# Patient Record
Sex: FEMALE | Race: WHITE | HISPANIC OR LATINO | Employment: FULL TIME | URBAN - METROPOLITAN AREA
[De-identification: names, ages, dates, MRNs, and addresses within clinical notes are randomized per-mention and may not be internally consistent; named-entity substitution may affect disease eponyms.]

---

## 2018-02-22 ENCOUNTER — HOSPITAL ENCOUNTER (EMERGENCY)
Facility: HOSPITAL | Age: 23
Discharge: HOME | End: 2018-02-22
Attending: EMERGENCY MEDICINE | Admitting: EMERGENCY MEDICINE
Payer: COMMERCIAL

## 2018-02-22 VITALS
BODY MASS INDEX: 23.62 KG/M2 | TEMPERATURE: 98.5 F | RESPIRATION RATE: 20 BRPM | OXYGEN SATURATION: 99 % | DIASTOLIC BLOOD PRESSURE: 68 MMHG | SYSTOLIC BLOOD PRESSURE: 125 MMHG | HEART RATE: 100 BPM | WEIGHT: 125 LBS

## 2018-02-22 DIAGNOSIS — R56.9 SEIZURE (HCC): Primary | ICD-10-CM

## 2018-02-22 DIAGNOSIS — Z86.69 HISTORY OF EPILEPSY: ICD-10-CM

## 2018-02-22 DIAGNOSIS — Z91.14 NONCOMPLIANCE WITH MEDICATION REGIMEN: ICD-10-CM

## 2018-02-22 LAB
ATRIAL RATE: 96 BPM
EXT PREG TEST URINE: NEGATIVE
P AXIS: 72 DEGREES
PR INTERVAL: 166 MS
QRS AXIS: 92 DEGREES
QRSD INTERVAL: 82 MS
QT INTERVAL: 320 MS
QTC INTERVAL: 404 MS
T WAVE AXIS: 65 DEGREES
VENTRICULAR RATE: 96 BPM

## 2018-02-22 PROCEDURE — 93005 ELECTROCARDIOGRAM TRACING: CPT

## 2018-02-22 PROCEDURE — 81025 URINE PREGNANCY TEST: CPT | Performed by: EMERGENCY MEDICINE

## 2018-02-22 PROCEDURE — 99284 EMERGENCY DEPT VISIT MOD MDM: CPT

## 2018-02-22 RX ORDER — LAMOTRIGINE 100 MG/1
100 TABLET ORAL 2 TIMES DAILY
COMMUNITY
End: 2018-07-06

## 2018-02-22 RX ORDER — LAMOTRIGINE 100 MG/1
100 TABLET ORAL 2 TIMES DAILY
Qty: 30 TABLET | Refills: 0 | Status: SHIPPED | OUTPATIENT
Start: 2018-02-22 | End: 2018-02-22

## 2018-02-22 RX ORDER — LAMOTRIGINE 100 MG/1
100 TABLET ORAL 2 TIMES DAILY
Qty: 30 TABLET | Refills: 0 | Status: SHIPPED | OUTPATIENT
Start: 2018-02-22 | End: 2018-07-06 | Stop reason: SDUPTHER

## 2018-02-22 NOTE — ED PROVIDER NOTES
History  Chief Complaint   Patient presents with    Seizure - Prior Hx Of     Patient states she had a grand mal seizure at approximately 24 today while at work, patient was found on the floor by coworkers, seizure lasted approx 2 minutes; pt  has a h/o of seizures and missed dose of Lamictal this morning; pt  unsure if she hit head however pt denies pain, no injuries noted; GCS 13    49-year-old female with past medical history of epilepsy who is presenting after experiencing a seizure at approximately 24 while at work  The seizure was a witnessed, generalized tonic-clonic seizure that lasted approximately 2 minutes  Patient remained at work for over an hour as she did not want to come to the hospital because she does not have health insurance  Patient is on Lamictal twice daily for her epilepsy  She reports that her seizures have been well controlled, with her last seizure occurring several years ago  Patient admits that she has been noncompliant with her Lamictal   She states that she has missed numerous doses over the past week  Otherwise, patient denies any recent changes in her medical condition  She has no other complaints  Review of systems is otherwise negative  Patient denies fever, chills, diaphoresis, unintentional weight loss, headache, vision changes, extremity numbness or weakness, chest pain or pressure, palpitations, shortness of breath, cough, nausea, vomiting, abdominal pain, diarrhea, constipation, melena, hematochezia, dysuria, urinary frequency, and hematuria  Of note, patient states that she took Plan B approximately 1 week ago  However she states that her menstrual period started today  Plan:  Generalized tonic-clonic seizure in a patient with known history of epilepsy  Patient admits to medication noncompliance  Her neurological examination is nonfocal  She has no other complaints  Fingerstick glucose was within normal limits, 111   Therefore, we will check a urine pregnancy and advise the patient to take her Lamictal as directed  Prior to Admission Medications   Prescriptions Last Dose Informant Patient Reported? Taking?   lamoTRIgine (LaMICtal) 100 mg tablet 2/21/2018 at Unknown time  Yes Yes   Sig: Take 100 mg by mouth 2 (two) times a day      Facility-Administered Medications: None       Past Medical History:   Diagnosis Date    Asthma     Seizures ([facility] [address] )        History reviewed  No pertinent surgical history  History reviewed  No pertinent family history  I have reviewed and agree with the history as documented  Social History   Substance Use Topics    Smoking status: Never Smoker    Smokeless tobacco: Never Used    Alcohol use Yes      Comment: socially         Review of Systems   Constitutional: Negative for diaphoresis, fever and unexpected weight change  HENT: Negative for congestion, rhinorrhea and sore throat  Eyes: Negative for pain, discharge and visual disturbance  Respiratory: Negative for cough, shortness of breath and wheezing  Cardiovascular: Negative for chest pain, palpitations and leg swelling  Gastrointestinal: Negative for abdominal pain, blood in stool, constipation, diarrhea, nausea and vomiting  Genitourinary: Negative for dysuria, flank pain and hematuria  Musculoskeletal: Negative for arthralgias and joint swelling  Skin: Negative for rash and wound  Allergic/Immunologic: Negative for environmental allergies and food allergies  Neurological: Positive for seizures  Negative for dizziness, weakness and numbness  Hematological: Negative for adenopathy  Psychiatric/Behavioral: Negative for confusion and hallucinations         Physical Exam  ED Triage Vitals [02/22/18 1553]   Temperature Pulse Respirations Blood Pressure SpO2   98 5 °F (36 9 °C) (!) 111 20 157/79 98 %      Temp Source Heart Rate Source Patient Position - Orthostatic VS BP Location FiO2 (%)   Oral Monitor Sitting Right arm --      Pain Score No Pain           Orthostatic Vital Signs  Vitals:    02/22/18 1553 02/22/18 1600 02/22/18 1630   BP: 157/79 129/73 125/68   Pulse: (!) 111 (!) 106 100   Patient Position - Orthostatic VS: Sitting Sitting Lying       Physical Exam   Constitutional: She is oriented to person, place, and time  She appears well-developed and well-nourished  No distress  HENT:   Head: Normocephalic and atraumatic  Right Ear: External ear normal    Left Ear: External ear normal    Eyes: Conjunctivae and EOM are normal  Pupils are equal, round, and reactive to light  Cardiovascular: Normal rate, regular rhythm and normal heart sounds  No murmur heard  Pulmonary/Chest: Effort normal and breath sounds normal  No respiratory distress  She has no wheezes  She has no rales  Abdominal: Soft  Bowel sounds are normal  She exhibits no distension  There is no tenderness  There is no guarding  Musculoskeletal: Normal range of motion  She exhibits no deformity  Neurological: She is alert and oriented to person, place, and time  Patient is alert and oriented to time, person, place, and situation  Speech is fluent with no aphasia or dysarthria  CN II-XII are intact  Strength is 5/5 in the upper and lower extremities bilaterally  Sensation grossly intact  No dysmetria on finger to nose testing  No pronator drift  Skin: Skin is warm and dry  Capillary refill takes less than 2 seconds  Psychiatric: She has a normal mood and affect  Her behavior is normal  Thought content normal    Nursing note and vitals reviewed        ED Medications  Medications - No data to display    Diagnostic Studies  Results Reviewed     Procedure Component Value Units Date/Time    POCT pregnancy, urine [[id number]]  (Normal) Resulted:  02/22/18 1640    Lab Status:  Final result Updated:  02/22/18 1640     EXT PREG TEST UR (Ref: Negative) NEGATIVE                 No orders to display         Procedures  ECG 12 Lead Documentation  Date/Time: 2/22/2018 5:10 PM  Performed by: Hay Iniguez by: Jere Diehl     Patient location:  ED  Previous ECG:     Previous ECG:  Compared to current    Comparison ECG info:  February 24, 2014    Similarity:  No change    Comparison to cardiac monitor: Yes    Interpretation:     Interpretation: normal    Rate:     ECG rate:  96    ECG rate assessment: normal    Rhythm:     Rhythm: sinus rhythm    Ectopy:     Ectopy: none    QRS:     QRS axis:  Right    QRS intervals:  Normal  Conduction:     Conduction: normal    ST segments:     ST segments:  Normal  T waves:     T waves: normal    Comments:      EKG demonstrates normal sinus rhythm at 96 beats per minute  No acute ischemic changes such as Q waves, T wave inversions, ST segment depressions, or ST segment elevations  No right heart strain  Right axis deviation was present on prior EKG  No significant change from prior EKG  Overall, normal EKG  Phone Consults  ED Phone Contact    ED Course  ED Course as of Feb 23 0015   Thu Feb 22, 2018   1640 EXT PREG TEST UR (Ref: Negative): NEGATIVE                               MDM  Number of Diagnoses or Management Options  History of epilepsy: established and worsening  Noncompliance with medication regimen: new and does not require workup  Seizure [facility]): established and worsening  Diagnosis management comments:     41-year-old female with past medical history of epilepsy who is presenting after experiencing a witnessed generalized tonoclonic seizure at work  1   Seizure:  Patient has a known history of epilepsy  This has been relatively well controlled on Lamictal, as patient last had a seizure several years ago  Patient admits to noncompliance with her Lamictal   Patient had no other complaints  Vital signs within normal limits  Fingerstick glucose was 111  Neurological examination was nonfocal  Urine pregnancy test was negative   Due to patient's admitted noncompliance and her known history of epilepsy, no further workup is required  Patient was advised to take her Lamictal as prescribed and to follow up with her neurologist   Patient understands and agrees with the plan  We will discharge her at this time  Portions of the chart may have been created with voice recognition software  Occasional wrong word or "sound a like" substitutions may have occurred due to the inherent limitations of voice recognition software  Please read the chart carefully and recognize, using context, where substitutions have occurred  Amount and/or Complexity of Data Reviewed  Clinical lab tests: ordered and reviewed  Decide to obtain previous medical records or to obtain history from someone other than the patient: yes  Review and summarize past medical records: yes    Risk of Complications, Morbidity, and/or Mortality  Presenting problems: moderate  Diagnostic procedures: minimal  Management options: minimal    Patient Progress  Patient progress: improved    CritCare Time    Disposition  Final diagnoses:   [facility] )   History of epilepsy   Noncompliance with medication regimen     Time reflects when diagnosis was documented in both MDM as applicable and the Disposition within this note     Time User Action Codes Description Comment    2/22/2018  4:44 PM Harika Block Add [R56 9] Seizure ([facility] [address] )     2/22/2018  4:44 PM Harika Block Add [Z86 69] History of epilepsy     2/22/2018  4:44 PM Harika Block Add [Z91 14] Noncompliance with medication regimen       ED Disposition     ED Disposition Condition Comment    Discharge  Betsy Keys discharge to home/self care  Condition at discharge: Good        Follow-up Information     Follow up With Specialties Details Why Contact Info Additional Asmita Monsivais Neurology [facility] Neurology Call As needed   [address] [facility]  [address] Emergency Department Emergency Medicine Go to If symptoms worsen: recurrent seizures despite taking medication, severe headache, arm/leg numbness or weakness  [address] [facility] ED, [address] [facility] [address], [address], [place], [address]        Discharge Medication List as of 2/22/2018  4:47 PM      START taking these medications    Details   !! lamoTRIgine (LaMICtal) 100 mg tablet Take 1 tablet (100 mg total) by mouth 2 (two) times a day, Starting Thu 2/22/2018, Normal       !! - Potential duplicate medications found  Please discuss with provider  CONTINUE these medications which have NOT CHANGED    Details   !! lamoTRIgine (LaMICtal) 100 mg tablet Take 100 mg by mouth 2 (two) times a day, Historical Med       !! - Potential duplicate medications found  Please discuss with provider  No discharge procedures on file  ED Provider  Attending physically available and evaluated Lawrence Barahona I managed the patient along with the ED Attending      Electronically Signed by         María Haddad MD  02/23/18 3409

## 2018-02-22 NOTE — ED ATTENDING ATTESTATION
Joe Goodman MD, saw and evaluated the patient  I have discussed the patient with the resident/non-physician practitioner and agree with the resident's/non-physician practitioner's findings, Plan of Care, and MDM as documented in the resident's/non-physician practitioner's note, except where noted  All available labs and Radiology studies were reviewed  At this point I agree with the current assessment done in the Emergency Department  I have conducted an independent evaluation of this patient including a focused history and a physical exam     20-year-old female, history of seizure disorder, typically well controlled on Lamictal, presenting to the emergency department for evaluation of a witnessed grand mal seizure that lasted approximately 2 minutes  Patient believes that she might have fallen from a standing position but does not believe that she hit her head because she is having no head pain, neck pain, chest pain, shoulder pain, abdominal pain  Patient admits that she has been noncompliant with her Lamictal, often missing her afternoon dose  Patient currently denies headache  Ten systems reviewed negative except as noted in the history of present illness  The patient is resting comfortably on a stretcher in no acute respiratory distress  The patient appears nontoxic  HEENT reveals moist mucous membranes  Head is normocephalic and atraumatic  Conjunctiva and sclera are normal  Neck is nontender and supple with full range of motion to flexion, extension, lateral rotation  No meningismus appreciated  No masses are appreciated  Lungs are clear to auscultation bilaterally without any wheezes, rales or rhonchi  Heart is regular rate and rhythm without any murmurs, rubs or gallops  Abdomen is soft and nontender without any rebound or guarding  Extremities appear grossly normal without any significant arthropathy  Patient is awake, alert, and oriented x3  The patient has normal interaction  Cranial nerves 2-12 are intact  Motor is 5 out of 5 bilateral upper lower extremities  Sensation intact  Normal gait  69-year-old female presenting with breakthrough seizure secondary to medication noncompliance  Patient was counseled that she should not be driving a motor vehicle if she is noncompliant with her medications and having breakthrough seizures  Patient was counseled that she should be taking her medications as prescribed but could potentially follow up with her neurologist to see about getting changed will once daily medication  Patient understood instructions      Labs Reviewed   POCT PREGNANCY, URINE - Normal       Result Value Ref Range Status    EXT PREG TEST UR (Ref: Negative) NEGATIVE   Final

## 2018-02-22 NOTE — DISCHARGE INSTRUCTIONS
Please take Lamictal as directed  Please do not drive when you do not take your Lamictal as this places you and others at serious risk  Recurrent Seizures in Adults   WHAT YOU NEED TO KNOW:   A seizure is an episode of abnormal brain activity  A seizure can cause jerky muscle movements, loss of consciousness, or confusion  Recurrent means you have a seizure more than once  The cause of your seizures may not be known  Recurrent seizures may occur if you do not take antiseizure medicine as directed  Some common triggers are alcohol, drugs, lack of sleep, fever, or a virus  High or low blood sugar levels can also trigger a seizure  DISCHARGE INSTRUCTIONS:   Call 911 or have someone else call for any of the following:   · Your seizure lasts longer than 5 minutes  · You have a second seizure within 24 hours of your first     · You have trouble breathing after a seizure  · You cannot be woken after your seizure  · You have more than 1 seizure before you are fully awake or aware  · You have diabetes or are pregnant and have a seizure  · You have a seizure in water  Return to the emergency department if:   · You are injured during a seizure  Contact your healthcare provider if:   · You have a fever  · You are planning to get pregnant or are currently pregnant  · You have questions or concerns about your condition or care  Medicine:   · Antiepileptic  medicine may be given to control or prevent seizures  Do not  stop taking this medicine without the direction of a healthcare provider  · Take your medicine as directed  Contact your healthcare provider if you think your medicine is not helping or if you have side effects  Tell him of her if you are allergic to any medicine  Keep a list of the medicines, vitamins, and herbs you take  Include the amounts, and when and why you take them  Bring the list or the pill bottles to follow-up visits   Carry your medicine list with you in case of an emergency  Prevent another seizure:   · Take your antiseizure medicine every day at the same time  This will also help reduce side effects  Do not skip any doses  Do not stop taking this medicine unless directed by a healthcare provider  · Manage stress  Stress can trigger a seizure  Exercise can help you reduce stress  Talk to your healthcare provider about exercise that is safe for you  Other ways to manage stress include yoga, meditation, and biofeedback  Illness can be a form of stress  Eat a variety of healthy foods and drink plenty of liquids during an illness  · Set a regular sleep schedule  A lack of sleep can trigger a seizure  Try to go to sleep and wake up at the same times every day  Keep your bedroom quiet and dark  Talk to your healthcare provider if you are having trouble sleeping  · Manage other medical conditions  Manage other health conditions that may increase your risk for a seizure  Keep your blood sugar levels and blood pressure under control  · Limit or do not drink alcohol as directed  Alcohol can trigger a seizure, especially if you drink a large amount at one time  A drink of alcohol is 12 ounces of beer, 1½ ounces of liquor, or 5 ounces of wine  Talk to your healthcare provider about a safe amount of alcohol for you  Your provider may recommend that you do not drink any alcohol  Tell him or her if you need help to quit drinking  Manage recurrent seizures:   · Ask what safety precautions you should take  Talk with your healthcare provider about driving  You may not be able to drive until you are seizure-free for a period of time  You will need to check the law where you live  Also talk to your healthcare provider about swimming and bathing  You may drown or develop life-threatening heart or lung damage if you have a seizure in water  · Tell your friends, family members, and coworkers that you had a seizure    Give them the following instructions to use if you have another seizure:     ¨ Do not panic  ¨ Gently guide me to the floor or a soft surface  ¨ Do not hold me down or put anything in my mouth  ¨ Place me on my side to help prevent me from swallowing saliva or vomit  ¨ Protect me from injury  Remove sharp or hard objects from the area surrounding me, or cushion my head  ¨ Loosen the clothing around my head and neck  ¨ Time how long my seizure lasts  Call 911 if my seizure lasts longer than 5 minutes or if I have a second seizure  ¨ Stay with me until my seizure ends  Let me rest until I am fully awake  ¨ Perform CPR if I stop breathing or you cannot feel my pulse  ¨ Do not give me anything to eat or drink until I am fully awake  Follow up with your healthcare provider or neurologist as directed: You may need more tests to find the cause of your seizure  You may also need tests to check the level of antiseizure medicine in your blood  Your neurologist may need to change or adjust your medicine  Write down your questions so you remember to ask them during your visits  © 2017 [address] Information is for End User's use only and may not be sold, redistributed or otherwise used for commercial purposes  All illustrations and images included in CareNotes® are the copyrighted property of A [employer] M [employer] Inc  or [employer] Can[employer]  The above information is an  only  It is not intended as medical advice for individual conditions or treatments  Talk to your doctor, nurse or pharmacist before following any medical regimen to see if it is safe and effective for you

## 2018-07-06 DIAGNOSIS — R56.9 SEIZURE (HCC): ICD-10-CM

## 2018-07-06 DIAGNOSIS — Z86.69 HISTORY OF EPILEPSY: ICD-10-CM

## 2018-07-06 DIAGNOSIS — G40.309 GENERALIZED CONVULSIVE EPILEPSY (HCC): Primary | ICD-10-CM

## 2018-07-06 RX ORDER — LAMOTRIGINE 100 MG/1
100 TABLET ORAL 2 TIMES DAILY
Qty: 60 TABLET | Refills: 2 | Status: SHIPPED | OUTPATIENT
Start: 2018-07-06 | End: 2018-10-22

## 2018-07-06 NOTE — TELEPHONE ENCOUNTER
Rx sent  Please have her get a lamotrigine level done  Please inquire about compliance in the setting of a seizure due to noncompliance earlier this year  Remind her of scheduled f/u appt

## 2018-07-09 NOTE — TELEPHONE ENCOUNTER
Pt made aware of the below, states that she has been taking her medications as prescribed  Denies having any seizures  She will get her labs done within the [address] and keep her f/u appt

## 2018-07-10 ENCOUNTER — APPOINTMENT (OUTPATIENT)
Dept: LAB | Facility: HOSPITAL | Age: 23
End: 2018-07-10
Attending: PSYCHIATRY & NEUROLOGY
Payer: COMMERCIAL

## 2018-07-10 DIAGNOSIS — G40.309 GENERALIZED CONVULSIVE EPILEPSY (HCC): ICD-10-CM

## 2018-07-10 PROCEDURE — 36415 COLL VENOUS BLD VENIPUNCTURE: CPT

## 2018-07-10 PROCEDURE — 80175 DRUG SCREEN QUAN LAMOTRIGINE: CPT

## 2018-07-11 LAB — LAMOTRIGINE SERPL-MCNC: 7.5 UG/ML (ref 2–20)

## 2018-07-19 ENCOUNTER — APPOINTMENT (OUTPATIENT)
Dept: LAB | Facility: HOSPITAL | Age: 23
End: 2018-07-19
Payer: COMMERCIAL

## 2018-07-19 ENCOUNTER — TRANSCRIBE ORDERS (OUTPATIENT)
Dept: LAB | Facility: HOSPITAL | Age: 23
End: 2018-07-19

## 2018-07-19 DIAGNOSIS — Z11.3 SCREENING EXAMINATION FOR VENEREAL DISEASE: Primary | ICD-10-CM

## 2018-07-19 DIAGNOSIS — Z11.3 SCREENING EXAMINATION FOR VENEREAL DISEASE: ICD-10-CM

## 2018-07-19 PROCEDURE — 86803 HEPATITIS C AB TEST: CPT

## 2018-07-19 PROCEDURE — 36415 COLL VENOUS BLD VENIPUNCTURE: CPT

## 2018-07-19 PROCEDURE — 86592 SYPHILIS TEST NON-TREP QUAL: CPT

## 2018-07-19 PROCEDURE — 87389 HIV-1 AG W/HIV-1&-2 AB AG IA: CPT

## 2018-07-19 PROCEDURE — 87340 HEPATITIS B SURFACE AG IA: CPT

## 2018-07-20 LAB
HBV SURFACE AG SER QL: NORMAL
HCV AB SER QL: NORMAL
HIV 1+2 AB+HIV1 P24 AG SERPL QL IA: NORMAL
RPR SER QL: NORMAL

## 2018-07-25 ENCOUNTER — OFFICE VISIT (OUTPATIENT)
Dept: FAMILY MEDICINE CLINIC | Facility: CLINIC | Age: 23
End: 2018-07-25
Payer: COMMERCIAL

## 2018-07-25 VITALS
TEMPERATURE: 99.4 F | DIASTOLIC BLOOD PRESSURE: 58 MMHG | BODY MASS INDEX: 23.49 KG/M2 | OXYGEN SATURATION: 96 % | HEART RATE: 80 BPM | SYSTOLIC BLOOD PRESSURE: 100 MMHG | HEIGHT: 61 IN | RESPIRATION RATE: 16 BRPM | WEIGHT: 124.4 LBS

## 2018-07-25 DIAGNOSIS — Z83.3 FAMILY HISTORY OF DIABETES MELLITUS TYPE II: ICD-10-CM

## 2018-07-25 DIAGNOSIS — L20.82 FLEXURAL ECZEMA: ICD-10-CM

## 2018-07-25 DIAGNOSIS — Z91.010 PEANUT ALLERGY: ICD-10-CM

## 2018-07-25 DIAGNOSIS — Z13.220 SCREENING CHOLESTEROL LEVEL: ICD-10-CM

## 2018-07-25 DIAGNOSIS — Z13.1 SCREENING FOR DIABETES MELLITUS: ICD-10-CM

## 2018-07-25 DIAGNOSIS — Z00.00 HEALTHCARE MAINTENANCE: Primary | ICD-10-CM

## 2018-07-25 PROCEDURE — 3725F SCREEN DEPRESSION PERFORMED: CPT | Performed by: FAMILY MEDICINE

## 2018-07-25 PROCEDURE — 99385 PREV VISIT NEW AGE 18-39: CPT | Performed by: FAMILY MEDICINE

## 2018-07-25 RX ORDER — UREA 10 %
2 LOTION (ML) TOPICAL DAILY
COMMUNITY

## 2018-07-25 NOTE — PROGRESS NOTES
Assessment/Plan:    No problem-specific Assessment & Plan notes found for this encounter  Diagnoses and all orders for this visit:    Healthcare maintenance  -     Comprehensive metabolic panel; Future  -     Lipid Panel with Direct LDL reflex; Future  -     CBC and differential; Future  -     HEMOGLOBIN A1C W/ EAG ESTIMATION; Future    Flexural eczema  -     triamcinolone (KENALOG) 0 1 % ointment; Apply topically 2 (two) times a day  -     Comprehensive metabolic panel; Future  -     Lipid Panel with Direct LDL reflex; Future  -     CBC and differential; Future  -     HEMOGLOBIN A1C W/ EAG ESTIMATION; Future    Family history of diabetes mellitus type II  -     Comprehensive metabolic panel; Future  -     Lipid Panel with Direct LDL reflex; Future  -     CBC and differential; Future  -     HEMOGLOBIN A1C W/ EAG ESTIMATION; Future    Screening cholesterol level  -     Comprehensive metabolic panel; Future  -     Lipid Panel with Direct LDL reflex; Future  -     CBC and differential; Future  -     HEMOGLOBIN A1C W/ EAG ESTIMATION; Future    Screening for diabetes mellitus  -     Comprehensive metabolic panel; Future  -     Lipid Panel with Direct LDL reflex; Future  -     CBC and differential; Future  -     HEMOGLOBIN A1C W/ EAG ESTIMATION; Future    Peanut allergy  -     Ambulatory referral to Allergy; Future    Other orders  -     folic acid (FOLVITE) 358 MCG tablet; Take 1 tablet by mouth daily        Get labs  HM in 1 yr    Subjective:      Patient ID: Sukih Patricia is a 23 y o  female  HPI  Patient presents for a physical  Patient is a 25-year-old  female with history of epilepsy, intermittent asthma, peanut allergies the presents for a yearly physical   She has no acute issues or complaints today  For her seizure disorder she follows with [facility] neurologist is currently on Lamictal   She also follows with [facility] gyn and she is up-to-date with her Pap    She is a healthy balanced diet  Normal BMI  Denies smoking or illegaldrug use  Admits to drinking alcohol socially  She least for her dad and younger brother and her daughter who is 5years old  She works full-time as a   She is currently sexually active in a monogamous relationship, she uses the "pull out" method for contraception and occasional use of condoms  She is restricted on the type of OCP due to her current epilepsy treatment  The following portions of the patient's history were reviewed and updated as appropriate: allergies, current medications, past family history, past medical history, past social history, past surgical history and problem list     Review of Systems   Constitutional: Negative for activity change and appetite change  HENT: Negative  Eyes: Negative  Respiratory: Negative  Cardiovascular: Negative  Gastrointestinal: Negative  Endocrine: Negative  Genitourinary: Negative  Musculoskeletal: Negative for arthralgias  Skin: Positive for rash  Neurological: Negative  Hematological: Negative  Psychiatric/Behavioral: Negative  Objective:      /58   Pulse 80   Temp 99 4 °F (37 4 °C)   Resp 16   Ht 5' 0 83" (1 545 m)   Wt 56 4 kg (124 lb 6 4 oz)   LMP 07/01/2018   SpO2 96%   BMI 23 64 kg/m²          Physical Exam   Constitutional: She is oriented to person, place, and time  She appears well-developed and well-nourished  No distress  HENT:   Head: Normocephalic  Right Ear: External ear normal    Left Ear: External ear normal    Mouth/Throat: Oropharynx is clear and moist    Eyes: Conjunctivae and EOM are normal    Neck: No thyromegaly present  Cardiovascular: Normal rate, regular rhythm and normal heart sounds  Pulmonary/Chest: Effort normal and breath sounds normal  No respiratory distress  She has no wheezes  She has no rales  Abdominal: Soft  Bowel sounds are normal  She exhibits no distension  There is no tenderness  Musculoskeletal: Normal range of motion  Neurological: She is alert and oriented to person, place, and time  No cranial nerve deficit  Skin: Rash (dry patch- eczema on right side neck) noted  Psychiatric: She has a normal mood and affect   Her behavior is normal

## 2018-09-02 ENCOUNTER — HOSPITAL ENCOUNTER (EMERGENCY)
Facility: HOSPITAL | Age: 23
Discharge: HOME | End: 2018-09-02
Attending: EMERGENCY MEDICINE | Admitting: EMERGENCY MEDICINE

## 2018-09-02 ENCOUNTER — APPOINTMENT (EMERGENCY)
Dept: RADIOLOGY | Facility: HOSPITAL | Age: 23
End: 2018-09-02

## 2018-09-02 VITALS
TEMPERATURE: 98.8 F | HEART RATE: 102 BPM | HEIGHT: 60 IN | OXYGEN SATURATION: 97 % | BODY MASS INDEX: 24.54 KG/M2 | RESPIRATION RATE: 18 BRPM | SYSTOLIC BLOOD PRESSURE: 112 MMHG | DIASTOLIC BLOOD PRESSURE: 66 MMHG | WEIGHT: 125 LBS

## 2018-09-02 DIAGNOSIS — S93.402A LEFT ANKLE SPRAIN: Primary | ICD-10-CM

## 2018-09-02 PROCEDURE — 73610 X-RAY EXAM OF ANKLE: CPT

## 2018-09-02 PROCEDURE — 99283 EMERGENCY DEPT VISIT LOW MDM: CPT

## 2018-09-02 NOTE — ED ATTENDING ATTESTATION
Amy Christine MD, saw and evaluated the patient  All available labs and X-rays were ordered by me or the resident and have been reviewed by myself  I discussed the patient with the resident / non-physician and agree with the resident's / non-physician practitioner's findings and plan as documented in the resident's / non-physician practicitioner's note, except where noted  At this point, I agree with the current assessment done in the ED  Chief Complaint   Patient presents with    Ankle Injury     Patient reports she tripped this morning and injured her left ankle  23 F:  - was walking, rolled her ankle suffering inversion injury --> left ankle pain this morning  - ambulatory after the incident  - ambulatory now but has significant pain/swelling so she came in for evaluation  - has too much pain to go to work  - no medications used  PMH:  - None  PSH:  - None  Denies smoking, drinking, drugs  PE:  Vitals:    09/02/18 1751   BP: 112/66   BP Location: Left arm   Pulse: 102   Resp: 18   Temp: 98 8 °F (37 1 °C)   TempSrc: Tympanic   SpO2: 97%   Weight: 56 7 kg (125 lb)   Height: 5' (1 524 m)   General: VS reviewed  Appears in NAD  awake, alert  Well-nourished, well-developed  Appears stated age  Speaking normally in full sentences  Head: Normocephalic, atraumatic, nontender  Eyes: EOM-I  No diplopia  No hyphema  No subconjunctival hemorrhages  Symmetrical lids  ENT: Atraumatic external nose and ears  MMM  No malocclusion  No stridor  Normal phonation  No drooling  Normal swallowing  Neck: No JVD  CV: No pallor noted  Peripheral pulses +2 throughout  No chest wall tenderness  Lungs:   No tachypnea  No respiratory distress  MSK:   EHL/FHL/PF/DF/KF/KE/HF/HE 5/5  Point tenderness lateral malleolus with no ecchymosis noted  No tenderness of the 5th metatarsal, no tenderness of fibular head, no cog-sensation with rotation along joint of LisFranc  Skin: Dry, intact     Neuro: Awake, alert, GCS15, CN II-XII grossly intact  Motor grossly intact  Psychiatric/Behavioral: Appropriate mood and affect   Exam: deferred  A:  - Ankle pain  P:  - XR  - airsplint/crutches PRN  - ortho / pods f/u  - 13 point ROS was performed and all are normal unless stated in the history above  - Nursing note reviewed  Vitals reviewed  - Orders placed by myself and/or advanced practitioner / resident     - Previous chart was not reviewed  - No language barrier    - History obtained from patient  - There are no limitations to the history obtained  - Critical care time: Not applicable for this patient  Final Diagnosis:  1  Left ankle sprain      XR reviewed with the resident: appears no obvious fracture  Medications - No data to display  XR ankle 3+ views LEFT    (Results Pending)     Orders Placed This Encounter   Procedures    Crutches    XR ankle 3+ views LEFT     Labs Reviewed - No data to display  Time reflects when diagnosis was documented in both MDM as applicable and the Disposition within this note     Time User Action Codes Description Comment    9/2/2018  8:30 PM Gallo Weinstein Add [G96 589R] Right ankle sprain     9/2/2018  8:35 PM Gallo Weinstein Add [L97 851H] Left ankle sprain     9/2/2018  8:35 PM Gallo Weinstein Modify [P89 759S] Left ankle sprain     9/2/2018  8:35 PM Gallo Weinstein Remove [E65 131H] Right ankle sprain       ED Disposition     ED Disposition Condition Comment    Discharge  [address] discharge to home/self care      Condition at discharge: Good        Follow-up Information     Follow up With Specialties Details Why Contact Info    Jose Elias Alaniz MD [address] [place] [address] Orthopedic Surgery   [address] [id number]  [phone]        Discharge Medication List as of 9/2/2018  8:35 PM      CONTINUE these medications which have NOT CHANGED    Details   folic acid (FOLVITE) 609 MCG tablet Take 1 tablet by mouth daily, Historical Med      lamoTRIgine (LaMICtal) 100 mg tablet Take 1 tablet (100 mg total) by mouth 2 (two) times a day, Starting Fri 7/6/2018, Normal      triamcinolone (KENALOG) 0 1 % ointment Apply topically 2 (two) times a day, Starting Wed 7/25/2018, Normal           No discharge procedures on file  Prior to Admission Medications   Prescriptions Last Dose Informant Patient Reported? Taking?   folic acid (FOLVITE) 450 MCG tablet  Self Yes No   Sig: Take 1 tablet by mouth daily   lamoTRIgine (LaMICtal) 100 mg tablet  Self No No   Sig: Take 1 tablet (100 mg total) by mouth 2 (two) times a day   triamcinolone (KENALOG) 0 1 % ointment   No No   Sig: Apply topically 2 (two) times a day      Facility-Administered Medications: None       Portions of the record may have been created with voice recognition software  Occasional wrong word or "sound a like" substitutions may have occurred due to the inherent limitations of voice recognition software  Read the chart carefully and recognize, using context, where substitutions have occurred      Electronically signed by:  Kulwinder Groves

## 2018-09-03 NOTE — DISCHARGE INSTRUCTIONS

## 2018-09-03 NOTE — ED PROVIDER NOTES
History  Chief Complaint   Patient presents with    Ankle Injury     Patient reports she tripped this morning and injured her left ankle  This 47-year-old female who states she was walking at her house, when she "rolled her ankle" on the left side, and fell  To the ground  The patient states that she did not strike her head, and did not lose consciousness  The patient states she felt immediate pain at the left ankle, which is why she came to the hospital   She was able to ambulate immediately afterward, but with a significant limp  Patient denies any other injuries  She has no other symptoms currently  She denies pain elsewhere  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 Taking?   folic acid (FOLVITE) 595 MCG tablet  Self Yes No   Sig: Take 1 tablet by mouth daily   lamoTRIgine (LaMICtal) 100 mg tablet  Self No No   Sig: Take 1 tablet (100 mg total) by mouth 2 (two) times a day   triamcinolone (KENALOG) 0 1 % ointment   No No   Sig: Apply topically 2 (two) times a day      Facility-Administered Medications: None       Past Medical History:   Diagnosis Date    Allergic to peanuts     Anxiety     Asthma     Depression     Seizures (HCC)        Past Surgical History:   Procedure Laterality Date    INSERTION OF INTRAUTERINE DEVICE (IUD)         Family History   Problem Relation Age of Onset    Migraines Mother     Asthma Son     Diabetes Maternal Grandfather     Hypertension Maternal Grandfather     Asthma Other     Asthma Sister      I have reviewed and agree with the history as documented      Social History   Substance Use Topics    Smoking status: Current Some Day Smoker    Smokeless tobacco: Never Used    Alcohol use Yes      Comment: socially         Review of Systems Constitutional: Negative for chills and fever  HENT: Negative for congestion and rhinorrhea  Eyes: Negative for photophobia and visual disturbance  Respiratory: Negative for cough and shortness of breath  Cardiovascular: Negative for chest pain and palpitations  Gastrointestinal: Negative for abdominal pain, diarrhea, nausea and vomiting  Genitourinary: Negative for dysuria and frequency  Musculoskeletal: Positive for gait problem (  Secondary to pain)  Negative for neck pain and neck stiffness  Skin: Negative for pallor and rash  Neurological: Negative for light-headedness and headaches  All other systems reviewed and are negative  Physical Exam  ED Triage Vitals [09/02/18 1751]   Temperature Pulse Respirations Blood Pressure SpO2   98 8 °F (37 1 °C) 102 18 112/66 97 %      Temp Source Heart Rate Source Patient Position - Orthostatic VS BP Location FiO2 (%)   Tympanic Monitor Sitting Left arm --      Pain Score       8           Orthostatic Vital Signs  Vitals:    09/02/18 1751   BP: 112/66   Pulse: 102   Patient Position - Orthostatic VS: Sitting       Physical Exam   Constitutional: She is oriented to person, place, and time  Awake alert, well-appearing, no acute distress  Nontoxic in appearance  Appears stated age   HENT:   Head: Normocephalic and atraumatic  Mouth/Throat: Oropharynx is clear and moist  No oropharyngeal exudate  Eyes: Pupils are equal, round, and reactive to light  No scleral icterus  Neck: Normal range of motion  No JVD present  Cardiovascular: Normal rate, regular rhythm and normal heart sounds  No murmur heard  Pulmonary/Chest: Effort normal  No respiratory distress  She has no wheezes  She has no rales  Abdominal: Soft  She exhibits no distension  There is no tenderness  Musculoskeletal: Normal range of motion  She exhibits no edema     Tenderness to palpation of the lateral malleolus, as well as posterior to the lateral malleolus on the left side, with moderate swelling  No tenderness palpation of the medial malleolus  No tenderness to palpation of the 5th metacarpal, with the navicular  Neurological: She is alert and oriented to person, place, and time  She exhibits normal muscle tone  Skin: Skin is warm and dry  No rash noted  No pallor  ED Medications  Medications - No data to display    Diagnostic Studies  Results Reviewed     Procedure Component Value Units Date/Time    POCT pregnancy, urine [[id number]]     Lab Status:  No result                  XR ankle 3+ views LEFT    (Results Pending)         Procedures  Procedures      Phone Consults  ED Phone Contact    ED Course                               MDM  Number of Diagnoses or Management Options  Left ankle sprain:   Diagnosis management comments: This is a 20-year-old female presents status post left ankle injury after "rolling her ankle"  On exam, she does have tenderness to the left malleolus, but no other signs of injury  -  Ankle x-ray was negative for fracture  - Possible talofibular sprain   -  Plan for discharge home  Strict return precautions provided  Advised patient follow up with the orthopedics office should her pain continue, and to follow up with her primary care physician  CritCare Time    Disposition  Final diagnoses:   Left ankle sprain     Time reflects when diagnosis was documented in both MDM as applicable and the Disposition within this note     Time User Action Codes Description Comment    9/2/2018  8:30 PM Vibha List Add [N96 309Y] Right ankle sprain     9/2/2018  8:35 PM Vibha List Add [F69 002N] Left ankle sprain     9/2/2018  8:35 PM Vibha List Modify [N92 327Y] Left ankle sprain     9/2/2018  8:35 PM Vibha List Remove [S92 075M] Right ankle sprain       ED Disposition     ED Disposition Condition Comment    Discharge  [address] discharge to home/self care      Condition at discharge: Good        Follow-up Information     Follow up With Specialties Details Why Contact Info    Alfonso Napoles MD Family Medicine   [address]  [address] [facility] [address]  [address] [facility] [address] [phone]      [address] [place] [address] Orthopedic Surgery   [address] [id number] [phone]          Patient's Medications   Discharge Prescriptions    No medications on file     No discharge procedures on file  ED Provider  Attending physically available and evaluated Teresa Smalls I managed the patient along with the ED Attending      Electronically Signed by         Codie Lin MD  09/02/18 2884

## 2018-09-27 ENCOUNTER — HOSPITAL ENCOUNTER (EMERGENCY)
Facility: HOSPITAL | Age: 23
Discharge: HOME | End: 2018-09-27
Attending: EMERGENCY MEDICINE | Admitting: EMERGENCY MEDICINE

## 2018-09-27 VITALS
SYSTOLIC BLOOD PRESSURE: 96 MMHG | DIASTOLIC BLOOD PRESSURE: 56 MMHG | HEART RATE: 103 BPM | OXYGEN SATURATION: 98 % | TEMPERATURE: 98 F | RESPIRATION RATE: 18 BRPM

## 2018-09-27 DIAGNOSIS — R56.9 SEIZURE (HCC): Primary | ICD-10-CM

## 2018-09-27 LAB
ANION GAP SERPL CALCULATED.3IONS-SCNC: 10 MMOL/L (ref 4–13)
BASOPHILS # BLD AUTO: 0.03 THOUSANDS/ΜL (ref 0–0.1)
BASOPHILS NFR BLD AUTO: 0 % (ref 0–1)
BUN SERPL-MCNC: 13 MG/DL (ref 5–25)
CALCIUM SERPL-MCNC: 8.3 MG/DL (ref 8.3–10.1)
CHLORIDE SERPL-SCNC: 110 MMOL/L (ref 100–108)
CO2 SERPL-SCNC: 24 MMOL/L (ref 21–32)
CREAT SERPL-MCNC: 0.58 MG/DL (ref 0.6–1.3)
EOSINOPHIL # BLD AUTO: 0.28 THOUSAND/ΜL (ref 0–0.61)
EOSINOPHIL NFR BLD AUTO: 3 % (ref 0–6)
ERYTHROCYTE [DISTWIDTH] IN BLOOD BY AUTOMATED COUNT: 11.9 % (ref 11.6–15.1)
EXT PREG TEST URINE: NEGATIVE
GFR SERPL CREATININE-BSD FRML MDRD: 129 ML/MIN/1.73SQ M
GLUCOSE SERPL-MCNC: 87 MG/DL (ref 65–140)
HCT VFR BLD AUTO: 37.9 % (ref 34.8–46.1)
HGB BLD-MCNC: 12.6 G/DL (ref 11.5–15.4)
IMM GRANULOCYTES # BLD AUTO: 0.03 THOUSAND/UL (ref 0–0.2)
IMM GRANULOCYTES NFR BLD AUTO: 0 % (ref 0–2)
LYMPHOCYTES # BLD AUTO: 2.47 THOUSANDS/ΜL (ref 0.6–4.47)
LYMPHOCYTES NFR BLD AUTO: 28 % (ref 14–44)
MCH RBC QN AUTO: 31 PG (ref 26.8–34.3)
MCHC RBC AUTO-ENTMCNC: 33.2 G/DL (ref 31.4–37.4)
MCV RBC AUTO: 93 FL (ref 82–98)
MONOCYTES # BLD AUTO: 0.56 THOUSAND/ΜL (ref 0.17–1.22)
MONOCYTES NFR BLD AUTO: 6 % (ref 4–12)
NEUTROPHILS # BLD AUTO: 5.49 THOUSANDS/ΜL (ref 1.85–7.62)
NEUTS SEG NFR BLD AUTO: 63 % (ref 43–75)
NRBC BLD AUTO-RTO: 0 /100 WBCS
PLATELET # BLD AUTO: 266 THOUSANDS/UL (ref 149–390)
PMV BLD AUTO: 10.2 FL (ref 8.9–12.7)
POTASSIUM SERPL-SCNC: 3.7 MMOL/L (ref 3.5–5.3)
RBC # BLD AUTO: 4.07 MILLION/UL (ref 3.81–5.12)
SODIUM SERPL-SCNC: 144 MMOL/L (ref 136–145)
WBC # BLD AUTO: 8.86 THOUSAND/UL (ref 4.31–10.16)

## 2018-09-27 PROCEDURE — 36415 COLL VENOUS BLD VENIPUNCTURE: CPT | Performed by: EMERGENCY MEDICINE

## 2018-09-27 PROCEDURE — 99284 EMERGENCY DEPT VISIT MOD MDM: CPT

## 2018-09-27 PROCEDURE — 80175 DRUG SCREEN QUAN LAMOTRIGINE: CPT | Performed by: EMERGENCY MEDICINE

## 2018-09-27 PROCEDURE — 80048 BASIC METABOLIC PNL TOTAL CA: CPT | Performed by: EMERGENCY MEDICINE

## 2018-09-27 PROCEDURE — 81025 URINE PREGNANCY TEST: CPT | Performed by: EMERGENCY MEDICINE

## 2018-09-27 PROCEDURE — 85025 COMPLETE CBC W/AUTO DIFF WBC: CPT | Performed by: EMERGENCY MEDICINE

## 2018-09-27 RX ORDER — LAMOTRIGINE 25 MG/1
25 TABLET ORAL 2 TIMES DAILY
Qty: 60 TABLET | Refills: 0 | Status: SHIPPED | OUTPATIENT
Start: 2018-09-27 | End: 2018-10-22

## 2018-09-27 RX ORDER — LAMOTRIGINE 100 MG/1
100 TABLET ORAL 2 TIMES DAILY
Qty: 60 TABLET | Refills: 0 | Status: SHIPPED | OUTPATIENT
Start: 2018-09-27 | End: 2018-10-22

## 2018-09-27 NOTE — DISCHARGE INSTRUCTIONS
Recurrent Seizures in Adults   WHAT YOU NEED TO KNOW:   A seizure is an episode of abnormal brain activity  A seizure can cause jerky muscle movements, loss of consciousness, or confusion  Recurrent means you have a seizure more than once  The cause of your seizures may not be known  Recurrent seizures may occur if you do not take antiseizure medicine as directed  Some common triggers are alcohol, drugs, lack of sleep, fever, or a virus  High or low blood sugar levels can also trigger a seizure  DISCHARGE INSTRUCTIONS:   Call 911 or have someone else call for any of the following:   · Your seizure lasts longer than 5 minutes  · You have a second seizure within 24 hours of your first     · You have trouble breathing after a seizure  · You cannot be woken after your seizure  · You have more than 1 seizure before you are fully awake or aware  · You have diabetes or are pregnant and have a seizure  · You have a seizure in water  Return to the emergency department if:   · You are injured during a seizure  Contact your healthcare provider if:   · You have a fever  · You are planning to get pregnant or are currently pregnant  · You have questions or concerns about your condition or care  Medicine:   · Antiepileptic  medicine may be given to control or prevent seizures  Do not  stop taking this medicine without the direction of a healthcare provider  · Take your medicine as directed  Contact your healthcare provider if you think your medicine is not helping or if you have side effects  Tell him of her if you are allergic to any medicine  Keep a list of the medicines, vitamins, and herbs you take  Include the amounts, and when and why you take them  Bring the list or the pill bottles to follow-up visits  Carry your medicine list with you in case of an emergency  Prevent another seizure:   · Take your antiseizure medicine every day at the same time  This will also help reduce side effects   Do not skip any doses  Do not stop taking this medicine unless directed by a healthcare provider  · Manage stress  Stress can trigger a seizure  Exercise can help you reduce stress  Talk to your healthcare provider about exercise that is safe for you  Other ways to manage stress include yoga, meditation, and biofeedback  Illness can be a form of stress  Eat a variety of healthy foods and drink plenty of liquids during an illness  · Set a regular sleep schedule  A lack of sleep can trigger a seizure  Try to go to sleep and wake up at the same times every day  Keep your bedroom quiet and dark  Talk to your healthcare provider if you are having trouble sleeping  · Manage other medical conditions  Manage other health conditions that may increase your risk for a seizure  Keep your blood sugar levels and blood pressure under control  · Limit or do not drink alcohol as directed  Alcohol can trigger a seizure, especially if you drink a large amount at one time  A drink of alcohol is 12 ounces of beer, 1½ ounces of liquor, or 5 ounces of wine  Talk to your healthcare provider about a safe amount of alcohol for you  Your provider may recommend that you do not drink any alcohol  Tell him or her if you need help to quit drinking  Manage recurrent seizures:   · Ask what safety precautions you should take  Talk with your healthcare provider about driving  You may not be able to drive until you are seizure-free for a period of time  You will need to check the law where you live  Also talk to your healthcare provider about swimming and bathing  You may drown or develop life-threatening heart or lung damage if you have a seizure in water  · Tell your friends, family members, and coworkers that you had a seizure  Give them the following instructions to use if you have another seizure:     ¨ Do not panic  ¨ Gently guide me to the floor or a soft surface             ¨ Do not hold me down or put anything in my mouth     ¨ Place me on my side to help prevent me from swallowing saliva or vomit  ¨ Protect me from injury  Remove sharp or hard objects from the area surrounding me, or cushion my head  ¨ Loosen the clothing around my head and neck  ¨ Time how long my seizure lasts  Call 911 if my seizure lasts longer than 5 minutes or if I have a second seizure  ¨ Stay with me until my seizure ends  Let me rest until I am fully awake  ¨ Perform CPR if I stop breathing or you cannot feel my pulse  ¨ Do not give me anything to eat or drink until I am fully awake  Follow up with your healthcare provider or neurologist as directed: You may need more tests to find the cause of your seizure  You may also need tests to check the level of antiseizure medicine in your blood  Your neurologist may need to change or adjust your medicine  Write down your questions so you remember to ask them during your visits  © 2017 [address] [employer] Information is for End User's use only and may not be sold, redistributed or otherwise used for commercial purposes  All illustrations and images included in CareNotes® are the copyrighted property of [employer]  or [employer] Can[employer]  The above information is an  only  It is not intended as medical advice for individual conditions or treatments  Talk to your doctor, nurse or pharmacist before following any medical regimen to see if it is safe and effective for you
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
## 2018-09-28 ENCOUNTER — DOCUMENTATION (OUTPATIENT)
Dept: NEUROLOGY | Facility: CLINIC | Age: 23
End: 2018-09-28

## 2018-09-28 ENCOUNTER — TELEPHONE (OUTPATIENT)
Dept: NEUROLOGY | Facility: CLINIC | Age: 23
End: 2018-09-28

## 2018-09-28 NOTE — TELEPHONE ENCOUNTER
I just replied to a task from an MA/MR and responded that 10/22 is ok  Her lamotrigine dose was increased in the ER  I also forwarded the task to Darlene Zamudio regarding submitting a [place] [employer] reporting form  I asked them to talk to administration if it was determined finances are a barrier to follow up  She does need to be seen  She should let us know if there are more seizures or if there are problems with her medication

## 2018-09-28 NOTE — TELEPHONE ENCOUNTER
Called patient to follow up from conversation early this am regarding appointment and fee for Dr Elise Frias visit  I explained that we could not waive fee but we would work with her regarding payment  She needs to pay a co-pay of $30-50 00 to show good jess for the visit and then we would assist her to make contact with [employer] to make payment plan  We could possibly discount rate of visit but cannot tell her prior to visit what it would be per Jhoan Louis  Patient understood and was appreciative and said that she could work with that
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
## 2018-09-30 LAB — LAMOTRIGINE SERPL-MCNC: NORMAL UG/ML (ref 2–20)

## 2018-10-22 ENCOUNTER — OFFICE VISIT (OUTPATIENT)
Dept: NEUROLOGY | Facility: CLINIC | Age: 23
End: 2018-10-22

## 2018-10-22 VITALS
DIASTOLIC BLOOD PRESSURE: 62 MMHG | SYSTOLIC BLOOD PRESSURE: 100 MMHG | BODY MASS INDEX: 24.41 KG/M2 | WEIGHT: 125 LBS | HEART RATE: 72 BPM

## 2018-10-22 DIAGNOSIS — Z91.14 NON COMPLIANCE W MEDICATION REGIMEN: ICD-10-CM

## 2018-10-22 DIAGNOSIS — G40.309 GENERALIZED CONVULSIVE EPILEPSY (HCC): Primary | ICD-10-CM

## 2018-10-22 PROCEDURE — 99212 OFFICE O/P EST SF 10 MIN: CPT | Performed by: PSYCHIATRY & NEUROLOGY

## 2018-10-22 RX ORDER — LAMOTRIGINE 100 MG/1
100 TABLET ORAL 2 TIMES DAILY
Qty: 60 TABLET | Refills: 11 | Status: SHIPPED | OUTPATIENT
Start: 2018-10-22 | End: 2019-02-25 | Stop reason: SDUPTHER
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
## 2018-10-22 NOTE — PATIENT INSTRUCTIONS
1  Consider progesterone only birth control (does not interfere with lamotrigine)  2  Take lamotrigine 100 mg twice daily  Use an alarm on your phone  Call us if you have trouble obtaining a supply  3  Return in about 4 months  Call me with an update if you are not able to follow up in 4 months  4  Call us if there are seizures  5  Use Good Rx card at [facility]  6  Talk to HR about getting insurance prior to open enrollment

## 2018-10-22 NOTE — PROGRESS NOTES
Patient ID: Michael Augustin is a 23 y o  female  Assessment/Plan:    No problem-specific Assessment & Plan notes found for this encounter  {Assess/PlanSmartLinks:63238}       Subjective:    HPI    {[facility] HPI texts:64074}    {Common ambulatory SmartLinks:13647}         Objective:    Weight 56 7 kg (125 lb)  Physical Exam    Neurological Exam      ROS:    Review of Systems   Constitutional: Negative  Negative for appetite change and fever  HENT: Negative  Negative for hearing loss, tinnitus, trouble swallowing and voice change  Eyes: Negative  Negative for photophobia and pain  Respiratory: Negative  Negative for shortness of breath  Cardiovascular: Negative  Negative for palpitations  Gastrointestinal: Negative  Negative for nausea and vomiting  Endocrine: Negative  Negative for cold intolerance and heat intolerance  Genitourinary: Negative  Negative for dysuria, frequency and urgency  Musculoskeletal: Positive for back pain  Negative for myalgias and neck pain  Skin: Negative  Negative for rash  Neurological: Positive for headaches  Negative for dizziness, tremors, seizures, syncope, facial asymmetry, speech difficulty, weakness, light-headedness and numbness  Hematological: Negative  Does not bruise/bleed easily  Psychiatric/Behavioral: Negative for confusion, hallucinations and sleep disturbance  The patient is nervous/anxious           Mood swings

## 2018-10-23 PROBLEM — Z91.148 NON COMPLIANCE W MEDICATION REGIMEN: Status: ACTIVE | Noted: 2018-10-23

## 2018-10-23 PROBLEM — Z91.14 NON COMPLIANCE W MEDICATION REGIMEN: Status: ACTIVE | Noted: 2018-10-23

## 2019-02-25 ENCOUNTER — OFFICE VISIT (OUTPATIENT)
Dept: NEUROLOGY | Facility: CLINIC | Age: 24
End: 2019-02-25

## 2019-02-25 VITALS
SYSTOLIC BLOOD PRESSURE: 118 MMHG | HEIGHT: 60 IN | HEART RATE: 78 BPM | BODY MASS INDEX: 25.86 KG/M2 | WEIGHT: 131.7 LBS | DIASTOLIC BLOOD PRESSURE: 72 MMHG

## 2019-02-25 DIAGNOSIS — G40.309 GENERALIZED CONVULSIVE EPILEPSY (HCC): ICD-10-CM

## 2019-02-25 PROCEDURE — 99214 OFFICE O/P EST MOD 30 MIN: CPT | Performed by: PSYCHIATRY & NEUROLOGY

## 2019-02-25 RX ORDER — LAMOTRIGINE 100 MG/1
100 TABLET ORAL 2 TIMES DAILY
Qty: 180 TABLET | Refills: 3 | Status: SHIPPED | OUTPATIENT
Start: 2019-02-25 | End: 2019-08-21 | Stop reason: SDUPTHER

## 2019-02-25 NOTE — PATIENT INSTRUCTIONS
1  Let us know if there are seizures including concerning jerks or staring spells  2  Let us know when you are 6 months seizure free and we can submit paperwork to [facility]  3  Return in about 6 months
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
## 2019-03-12 ENCOUNTER — OFFICE VISIT (OUTPATIENT)
Dept: URGENT CARE | Age: 24
End: 2019-03-12
Payer: COMMERCIAL

## 2019-03-12 VITALS
TEMPERATURE: 98.8 F | RESPIRATION RATE: 18 BRPM | HEART RATE: 103 BPM | WEIGHT: 130 LBS | SYSTOLIC BLOOD PRESSURE: 95 MMHG | OXYGEN SATURATION: 98 % | BODY MASS INDEX: 25.39 KG/M2 | DIASTOLIC BLOOD PRESSURE: 50 MMHG

## 2019-03-12 DIAGNOSIS — J11.1 INFLUENZA-LIKE ILLNESS: Primary | ICD-10-CM

## 2019-03-12 PROCEDURE — G0382 LEV 3 HOSP TYPE B ED VISIT: HCPCS | Performed by: FAMILY MEDICINE

## 2019-03-12 PROCEDURE — 87430 STREP A AG IA: CPT | Performed by: FAMILY MEDICINE

## 2019-03-12 PROCEDURE — 87147 CULTURE TYPE IMMUNOLOGIC: CPT | Performed by: FAMILY MEDICINE

## 2019-03-12 PROCEDURE — 87070 CULTURE OTHR SPECIMN AEROBIC: CPT | Performed by: FAMILY MEDICINE

## 2019-03-12 RX ORDER — OSELTAMIVIR PHOSPHATE 75 MG/1
75 CAPSULE ORAL EVERY 12 HOURS SCHEDULED
Qty: 10 CAPSULE | Refills: 0 | Status: SHIPPED | OUTPATIENT
Start: 2019-03-12 | End: 2019-03-17

## 2019-03-12 NOTE — LETTER
March 12, 2019     Patient: Mary Smith   YOB: 1995   Date of Visit: 3/12/2019       To Whom It May Concern: It is my medical opinion that Spenser Morales may return to work on 03/16/2019  If you have any questions or concerns, please don't hesitate to call           Sincerely,        Aleksandra Santos DO    CC: No Recipients

## 2019-03-12 NOTE — PROGRESS NOTES
[id number] [employer] [facility]        NAME: Lawrence Barahona is a 20 y o  female  : 1995    MRN: [id number]  DATE: 2019  TIME: 9:53 AM    Assessment and Plan   Influenza-like illness [R69]  1  Influenza-like illness  oseltamivir (TAMIFLU) 75 mg capsule         Patient Instructions     Patient Instructions   Rest, limit activity  Tamiflu twice a day for 10 doses (5 days)  Cold/cough medication as needed  Tylenol, or ibuprofen (Advil/Motrin) as needed  Gargle and swish mouth with warm saltwater or mouthwash  Recheck/follow-up with family physician as needed  Please go to the hospital emergency department if needed  Follow up with PCP in 3-5 days  Proceed to  ER if symptoms worsen  Chief Complaint     Chief Complaint   Patient presents with    Sore Throat     headache, chills  Symptoms since   Took Nyquil and Ibuprofen         History of Present Illness       Chills, headache, congestion, sore throat, weakness since  (03/10/2019); patient states a co-worker has the flu      Review of Systems   Review of Systems   Constitutional: Positive for chills  HENT: Positive for congestion and sore throat  Respiratory: Positive for cough  Cardiovascular: Negative  Musculoskeletal: Positive for myalgias  Skin: Positive for pallor  Neurological: Positive for headaches           Current Medications       Current Outpatient Medications:     folic acid (FOLVITE) 187 MCG tablet, Take 1 tablet by mouth daily, Disp: , Rfl:     lamoTRIgine (LaMICtal) 100 mg tablet, Take 1 tablet (100 mg total) by mouth 2 (two) times a day, Disp: 180 tablet, Rfl: 3    triamcinolone (KENALOG) 0 1 % ointment, Apply topically 2 (two) times a day, Disp: 30 g, Rfl: 0    oseltamivir (TAMIFLU) 75 mg capsule, Take 1 capsule (75 mg total) by mouth every 12 (twelve) hours for 10 doses, Disp: 10 capsule, Rfl: 0    Current Allergies     Allergies as of 2019    (No Known Allergies)            The following portions of the patient's history were reviewed and updated as appropriate: allergies, current medications, past family history, past medical history, past social history, past surgical history and problem list      Past Medical History:   Diagnosis Date    Allergic to peanuts     Anxiety     Asthma     Depression     Seizures ([address] )        Past Surgical History:   Procedure Laterality Date    INSERTION OF INTRAUTERINE DEVICE (IUD)         Family History   Problem Relation Age of Onset    Migraines Mother     Asthma Son     Diabetes Maternal Grandfather     Hypertension Maternal Grandfather     Asthma Other     Asthma Sister          Medications have been verified  Objective   BP 95/50   Pulse 103   Temp 98 8 °F (37 1 °C) (Temporal)   Resp 18   Wt 59 kg (130 lb)   SpO2 98%   BMI 25 39 kg/m²        Physical Exam     Physical Exam   Constitutional: She is oriented to person, place, and time  She appears well-developed and well-nourished  She appears ill  HENT:   Right Ear: Tympanic membrane and ear canal normal    Left Ear: Tympanic membrane and ear canal normal    Nasal congestion with purulence mucus; injection, slight erythema the oropharynx   Neck: Normal range of motion  Neck supple  Cardiovascular: Normal rate, regular rhythm and normal heart sounds  Pulmonary/Chest: Effort normal and breath sounds normal    Neurological: She is alert and oriented to person, place, and time  No nuchal rigidity   Skin: There is pallor  Fair turgor   Psychiatric: She has a normal mood and affect  Her behavior is normal    Nursing note and vitals reviewed

## 2019-03-12 NOTE — PATIENT INSTRUCTIONS
Rest, limit activity  Tamiflu twice a day for 10 doses (5 days)  Cold/cough medication as needed  Tylenol, or ibuprofen (Advil/Motrin) as needed  Gargle and swish mouth with warm saltwater or mouthwash  Recheck/follow-up with family physician as needed  Please go to the hospital emergency department if needed

## 2019-03-13 LAB — BACTERIA THROAT CULT: ABNORMAL

## 2019-03-15 ENCOUNTER — TELEPHONE (OUTPATIENT)
Dept: URGENT CARE | Age: 24
End: 2019-03-15

## 2019-03-15 NOTE — TELEPHONE ENCOUNTER
03/15/2019 - 8:58 a m :  Spoke with Betsy via phone; throat culture results reviewed (positive Strep Group C); patient states she is feeling better; options discussed with patient regarding starting an antibiotic (patient declined at this time as she is feeling better)

## 2019-03-28 ENCOUNTER — TELEPHONE (OUTPATIENT)
Dept: NEUROLOGY | Facility: CLINIC | Age: 24
End: 2019-03-28

## 2019-03-28 NOTE — TELEPHONE ENCOUNTER
fyi:  Pt called and states that she is 6 month seizure free, last seizure was 9/27/18  No new episode  Advised pt to call [employer] to request for the form  Then drop it off to our office, fax or mail  Pt verbalized understanding          [phone]

## 2019-04-24 NOTE — TELEPHONE ENCOUNTER
Patient called stating she spoke with Kenneth and asked them to send form via fax approx 15 mins ago  I informed patient that we use central fax and we should receive the form over the next 6-12 hours directly into her chart  I asked milton to call back tomorrow if she would like to confirm we received the form  I also informed patient of $5 fee for [employer] form completion and 2 week turn around time  Patient verbalized understanding

## 2019-05-02 ENCOUNTER — TELEPHONE (OUTPATIENT)
Dept: NEUROLOGY | Facility: CLINIC | Age: 24
End: 2019-05-02

## 2019-06-11 NOTE — PATIENT INSTRUCTIONS
Respiratory contacted to evaluate pt status for use of CPAP.    Wellness Visit for Adults   AMBULATORY CARE:   A wellness visit  is when you see your healthcare provider to get screened for health problems  You can also get advice on how to stay healthy  Write down your questions so you remember to ask them  Ask your healthcare provider how often you should have a wellness visit  What happens at a wellness visit:  Your healthcare provider will ask about your health, and your family history of health problems  This includes high blood pressure, heart disease, and cancer  He or she will ask if you have symptoms that concern you, if you smoke, and about your mood  You may also be asked about your intake of medicines, supplements, food, and alcohol  Any of the following may be done:  · Your weight  will be checked  Your height may also be checked so your body mass index (BMI) can be calculated  Your BMI shows if you are at a healthy weight  · Your blood pressure  and heart rate will be checked  Your temperature may also be checked  · Blood and urine tests  may be done  Blood tests may be done to check your cholesterol levels  Abnormal cholesterol levels increase your risk for heart disease and stroke  You may also need a blood or urine test to check for diabetes if you are at increased risk  Urine tests may be done to look for signs of an infection or kidney disease  · A physical exam  includes checking your heartbeat and lungs with a stethoscope  Your healthcare provider may also check your skin to look for sun damage  · Screening tests  may be recommended  A screening test is done to check for diseases that may not cause symptoms  The screening tests you may need depend on your age, gender, family history, and lifestyle habits  For example, colorectal screening may be recommended if you are 48years old or older  Screening tests you need if you are a woman:   · A Pap smear  is used to screen for cervical cancer   Pap smears are usually done every 3 to 5 years depending on your age  You may need them more often if you have had abnormal Pap smear test results in the past  Ask your healthcare provider how often you should have a Pap smear  · A mammogram  is an x-ray of your breasts to screen for breast cancer  Experts recommend mammograms every 2 years starting at age 48 years  You may need a mammogram at age 52 years or younger if you have an increased risk for breast cancer  Talk to your healthcare provider about when you should start having mammograms and how often you need them  Vaccines you may need:   · Get an influenza vaccine  every year  The influenza vaccine protects you from the flu  Several types of viruses cause the flu  The viruses change over time, so new vaccines are made each year  · Get a tetanus-diphtheria (Td) booster vaccine  every 10 years  This vaccine protects you against tetanus and diphtheria  Tetanus is a severe infection that may cause painful muscle spasms and lockjaw  Diphtheria is a severe bacterial infection that causes a thick covering in the back of your mouth and throat  · Get a human papillomavirus (HPV) vaccine  if you are female and aged 23 to 32 or male 23 to 24 and never received it  This vaccine protects you from HPV infection  HPV is the most common infection spread by sexual contact  HPV may also cause vaginal, penile, and anal cancers  · Get a pneumococcal vaccine  if you are aged 72 years or older  The pneumococcal vaccine is an injection given to protect you from pneumococcal disease  Pneumococcal disease is an infection caused by pneumococcal bacteria  The infection may cause pneumonia, meningitis, or an ear infection  · Get a shingles vaccine  if you are aged 61 or older, even if you have had shingles before  The shingles vaccine is an injection to protect you from the varicella-zoster virus  This is the same virus that causes chickenpox   Shingles is a painful rash that develops in people who had chickenpox or have been exposed to the virus  How to eat healthy:  My Plate is a model for planning healthy meals  It shows the types and amounts of foods that should go on your plate  Fruits and vegetables make up about half of your plate, and grains and protein make up the other half  A serving of dairy is included on the side of your plate  The amount of calories and serving sizes you need depends on your age, gender, weight, and height  Examples of healthy foods are listed below:  · Eat a variety of vegetables  such as dark green, red, and orange vegetables  You can also include canned vegetables low in sodium (salt) and frozen vegetables without added butter or sauces  · Eat a variety of fresh fruits , canned fruit in 100% juice, frozen fruit, and dried fruit  · Include whole grains  At least half of the grains you eat should be whole grains  Examples include whole-wheat bread, wheat pasta, brown rice, and whole-grain cereals such as oatmeal     · Eat a variety of protein foods such as seafood (fish and shellfish), lean meat, and poultry without skin (turkey and chicken)  Examples of lean meats include pork leg, shoulder, or tenderloin, and beef round, sirloin, tenderloin, and extra lean ground beef  Other protein foods include eggs and egg substitutes, beans, peas, soy products, nuts, and seeds  · Choose low-fat dairy products such as skim or 1% milk or low-fat yogurt, cheese, and cottage cheese  · Limit unhealthy fats  such as butter, hard margarine, and shortening  Exercise:  Exercise at least 30 minutes per day on most days of the week  Some examples of exercise include walking, biking, dancing, and swimming  You can also fit in more physical activity by taking the stairs instead of the elevator or parking farther away from stores  Include muscle strengthening activities 2 days each week  Regular exercise provides many health benefits   It helps you manage your weight, and decreases your risk for type 2 diabetes, heart disease, stroke, and high blood pressure  Exercise can also help improve your mood  Ask your healthcare provider about the best exercise plan for you  General health and safety guidelines:   · Do not smoke  Nicotine and other chemicals in cigarettes and cigars can cause lung damage  Ask your healthcare provider for information if you currently smoke and need help to quit  E-cigarettes or smokeless tobacco still contain nicotine  Talk to your healthcare provider before you use these products  · Limit alcohol  A drink of alcohol is 12 ounces of beer, 5 ounces of wine, or 1½ ounces of liquor  · Lose weight, if needed  Being overweight increases your risk of certain health conditions  These include heart disease, high blood pressure, type 2 diabetes, and certain types of cancer  · Protect your skin  Do not sunbathe or use tanning beds  Use sunscreen with a SPF 15 or higher  Apply sunscreen at least 15 minutes before you go outside  Reapply sunscreen every 2 hours  Wear protective clothing, hats, and sunglasses when you are outside  · Drive safely  Always wear your seatbelt  Make sure everyone in your car wears a seatbelt  A seatbelt can save your life if you are in an accident  Do not use your cell phone when you are driving  This could distract you and cause an accident  Pull over if you need to make a call or send a text message  · Practice safe sex  Use latex condoms if are sexually active and have more than one partner  Your healthcare provider may recommend screening tests for sexually transmitted infections (STIs)  · Wear helmets, lifejackets, and protective gear  Always wear a helmet when you ride a bike or motorcycle, go skiing, or play sports that could cause a head injury  Wear protective equipment when you play sports  Wear a lifejacket when you are on a boat or doing water sports    © 2017 [address] [employer] Information is for End User's use only and may not be sold, redistributed or otherwise used for commercial purposes  All illustrations and images included in CareNotes® are the copyrighted property of A D A M [employer] Inc  or Brian Canales  The above information is an  only  It is not intended as medical advice for individual conditions or treatments  Talk to your doctor, nurse or pharmacist before following any medical regimen to see if it is safe and effective for you

## 2019-08-21 ENCOUNTER — OFFICE VISIT (OUTPATIENT)
Dept: NEUROLOGY | Facility: CLINIC | Age: 24
End: 2019-08-21

## 2019-08-21 VITALS
DIASTOLIC BLOOD PRESSURE: 66 MMHG | HEART RATE: 84 BPM | WEIGHT: 121.4 LBS | SYSTOLIC BLOOD PRESSURE: 100 MMHG | BODY MASS INDEX: 23.84 KG/M2 | HEIGHT: 60 IN

## 2019-08-21 DIAGNOSIS — R45.89 DEPRESSED MOOD: ICD-10-CM

## 2019-08-21 DIAGNOSIS — G40.309 GENERALIZED CONVULSIVE EPILEPSY (HCC): Primary | ICD-10-CM

## 2019-08-21 PROCEDURE — 99213 OFFICE O/P EST LOW 20 MIN: CPT | Performed by: PSYCHIATRY & NEUROLOGY

## 2019-08-21 RX ORDER — LAMOTRIGINE 100 MG/1
100 TABLET ORAL 2 TIMES DAILY
Qty: 180 TABLET | Refills: 3 | Status: SHIPPED | OUTPATIENT
Start: 2019-08-21 | End: 2019-11-06 | Stop reason: SDUPTHER
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
## 2019-08-21 NOTE — PATIENT INSTRUCTIONS
1  Continue lamotrigine 100 mg twice daily  2  Let us know if there are events concerning for seizure  3  Return in about one year

## 2019-11-06 DIAGNOSIS — G40.309 GENERALIZED CONVULSIVE EPILEPSY (HCC): ICD-10-CM

## 2019-11-08 RX ORDER — LAMOTRIGINE 100 MG/1
TABLET ORAL
Qty: 60 TABLET | Refills: 7 | Status: SHIPPED | OUTPATIENT
Start: 2019-11-08 | End: 2020-09-02 | Stop reason: SDUPTHER

## 2020-03-25 ENCOUNTER — TELEPHONE (OUTPATIENT)
Dept: FAMILY MEDICINE CLINIC | Facility: CLINIC | Age: 25
End: 2020-03-25

## 2020-03-25 NOTE — TELEPHONE ENCOUNTER
Patient is overdue for an office visit, called and left a message for patient to call back- will offer virtual visit due to the COVID 19

## 2020-09-02 ENCOUNTER — OFFICE VISIT (OUTPATIENT)
Dept: NEUROLOGY | Facility: CLINIC | Age: 25
End: 2020-09-02

## 2020-09-02 VITALS
WEIGHT: 126.2 LBS | TEMPERATURE: 97.4 F | HEART RATE: 84 BPM | DIASTOLIC BLOOD PRESSURE: 70 MMHG | BODY MASS INDEX: 24.77 KG/M2 | SYSTOLIC BLOOD PRESSURE: 106 MMHG | HEIGHT: 60 IN

## 2020-09-02 DIAGNOSIS — G40.309 GENERALIZED CONVULSIVE EPILEPSY (HCC): ICD-10-CM

## 2020-09-02 PROCEDURE — 99214 OFFICE O/P EST MOD 30 MIN: CPT | Performed by: PSYCHIATRY & NEUROLOGY

## 2020-09-02 RX ORDER — LAMOTRIGINE 100 MG/1
100 TABLET ORAL 2 TIMES DAILY
Qty: 180 TABLET | Refills: 3 | Status: SHIPPED | OUTPATIENT
Start: 2020-09-02 | End: 2021-04-28 | Stop reason: SDUPTHER
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
## 2020-09-02 NOTE — PATIENT INSTRUCTIONS
1  Continue lamotrigine 100 mg twice daily  2  Let us know if you plan to get pregnant or discover you are pregnant  3  Let us know if there are seizures  4  Have blood work done  5  Return in one year

## 2020-09-04 ENCOUNTER — TELEPHONE (OUTPATIENT)
Dept: NEUROLOGY | Facility: CLINIC | Age: 25
End: 2020-09-04

## 2020-09-04 NOTE — TELEPHONE ENCOUNTER
Pt called and states that she is looking to get medical marijuana  Pt is requesting copy of most recent office notes  Advised pt that she will need to fill out BERENICE first before our office can release her record  Pt verbalized understanding  Pt will stop by [facility] office today before 5 pm to fill our berenice and get a copy of office notes dated 9/2/20

## 2020-09-04 NOTE — TELEPHONE ENCOUNTER
Patient came into office to sign BERENICE and get copy of 9/2 office note   BERENICE scanned in, note released to patient

## 2021-01-19 ENCOUNTER — AMB VIDEO VISIT (OUTPATIENT)
Dept: OTHER | Facility: HOSPITAL | Age: 26
End: 2021-01-19

## 2021-01-19 NOTE — CARE ANYWHERE EVISITS
Visit Summary for Jagdeep Hayes - Gender: Female - Date of Birth: [id number]  Date: [id number] - Duration: 13 minutes  Patient: Jagdeep Hayes  Provider: Jeannine Aburto    Patient Contact Information  Address  [address]; [address] [facility]  [phone]    Visit Topics  Strep throat [Added By: Self - 2021-01-19]    Triage Questions   What is your current physical address in the event of a medical emergency? Answer []  Are you allergic to any medications? Answer []  Are you now or could you be pregnant? Answer []  Do you have any immune system compromise or chronic lung   disease? Answer []  Do you have any vulnerable family members in the home (infant, pregnant, cancer, elderly)? Answer []     Conversation Transcripts  [0A][0A] [Notification] You are connected with Fazal Matos is located in [place]  [0A][Notification] Jagdeep Abigail has shared health history  Anisa Hernandez  [0A][Notification] Fazal Good  Keniaes Mar has   added a prescription  [0A]    Diagnosis  Acute pharyngitis, unspecified    Procedures  Value: [id number] Code: CPT-4 UNLISTED E&M SERVICE    Medications Prescribed    penicillin V potassium  Dose : 1 tablet  Route : oral  Frequency : every 12 hours  Patient Instructions : Take 1 hour before or 2 hours after food  Refills : 0  Instructions to the Pharmacist : Substitutions allowed      Provider Notes  [0A][0A] This week[0A]patient has developed bad sore throat; tonsils are inflamed w/white spots,[0A]fever/felt feverish, swollen glands on self- palpation, no cough  Possible exposure[0A]to strep  No shortness of breath  No[0A]sinus pain, no earache  No   wheeze, no dysphonia, no stridor  Visually is not[0A]toxic, not in distress, not in extremis  No other symptoms     She doesn't have insurance[0A]Centor score:[0A]Fever (>100 4):  1[0A]Tonsillar exudate:  1[0A]Tender ant cervical[0A]lymphadenopathy:    1[0A]Absence of cough:  1[0A]Age <15 (Age >44[0A](-1)):  0[0A] [0A]PMH: Epilepsy  Denies history of rheumatic fever and[0A]post-strep glomerulonephritis[0A]Meds:  Lamotrigine[0A][0A]Allergies: NKDA[0A]Pregnant[0A]and/or nursing:  denies[0A]Assessment:    pharyngitis, high likelihood of strep    Centor Score: 4/5  [0A]Plan:  Also recommend test for COVID[0A]I am sending a prescription Penicillin VK to your pharmacy; please allow 30 minutes for them to process it  If not improved within[0A]48 hours then   see primary care physician or Urgent Care for rapid strep and[0A]culture  If rash occurs on Penicillin it may be a "cillin" rash[0A]that can occur in patients who do not have strep but may have Mono  [0A] Please watch for rash, stop the medication and be   seen in person  [0A]Discussed precautions  [0A] Patient voiced understanding and agrees[0A]to plan [0A][0A]Follow up:[0A]1)      If you were prescribed medication and there[0A]are any questions or problems with the prescription, call [phone]   anytime for assistance  [0A]2)      Please re-connect for another online visit or see an in-person[0A]provider should your symptoms worsen or persist   [0A]3)      Taking a probiotic (either in pill[0A]form or by eating yogurt that contains probiotics)   while using antibiotics can[0A]help prevent some of the troublesome side effects that antibiotics can[0A]sometimes cause  [0A]4)     [0A]Please print a copy of this note and send it to your regular[0A]doctor, or take it to your next visit so it may be   included in your medical[0A]record5)     [0A]If you take birth control please use another method of[0A]contraception (e g  condoms) for 1 month   [0A]Antibiotics can interfere with the effectiveness of birth control   [0A][0A]    Electronically signed by: Goldie Mariscal(NPI [id number])

## 2021-01-21 ENCOUNTER — HOSPITAL ENCOUNTER (EMERGENCY)
Facility: HOSPITAL | Age: 26
Discharge: HOME | End: 2021-01-21
Attending: EMERGENCY MEDICINE | Admitting: EMERGENCY MEDICINE
Payer: COMMERCIAL

## 2021-01-21 VITALS
RESPIRATION RATE: 16 BRPM | TEMPERATURE: 98.1 F | OXYGEN SATURATION: 98 % | DIASTOLIC BLOOD PRESSURE: 97 MMHG | SYSTOLIC BLOOD PRESSURE: 127 MMHG | HEART RATE: 88 BPM

## 2021-01-21 DIAGNOSIS — B37.9 CANDIDIASIS: Primary | ICD-10-CM

## 2021-01-21 DIAGNOSIS — N93.9 VAGINAL BLEEDING: ICD-10-CM

## 2021-01-21 DIAGNOSIS — N89.8 VAGINAL DISCHARGE: ICD-10-CM

## 2021-01-21 LAB
BACTERIA UR QL AUTO: ABNORMAL /HPF
BILIRUB UR QL STRIP: ABNORMAL
CLARITY UR: CLEAR
COLOR UR: YELLOW
COLOR, POC: NORMAL
EXT PREG TEST URINE: NEGATIVE
EXT. CONTROL ED NAV: NORMAL
GLUCOSE UR STRIP-MCNC: NEGATIVE MG/DL
HGB UR QL STRIP.AUTO: ABNORMAL
HYALINE CASTS #/AREA URNS LPF: ABNORMAL /LPF
KETONES UR STRIP-MCNC: ABNORMAL MG/DL
LEUKOCYTE ESTERASE UR QL STRIP: ABNORMAL
NITRITE UR QL STRIP: NEGATIVE
NON-SQ EPI CELLS URNS QL MICRO: ABNORMAL /HPF
PH UR STRIP.AUTO: 6 [PH] (ref 4.5–8)
PROT UR STRIP-MCNC: NEGATIVE MG/DL
RBC #/AREA URNS AUTO: ABNORMAL /HPF
SP GR UR STRIP.AUTO: 1.02 (ref 1–1.03)
UROBILINOGEN UR QL STRIP.AUTO: 1 E.U./DL
WBC #/AREA URNS AUTO: ABNORMAL /HPF

## 2021-01-21 PROCEDURE — 87491 CHLMYD TRACH DNA AMP PROBE: CPT | Performed by: EMERGENCY MEDICINE

## 2021-01-21 PROCEDURE — 81025 URINE PREGNANCY TEST: CPT | Performed by: EMERGENCY MEDICINE

## 2021-01-21 PROCEDURE — 99284 EMERGENCY DEPT VISIT MOD MDM: CPT

## 2021-01-21 PROCEDURE — 81001 URINALYSIS AUTO W/SCOPE: CPT

## 2021-01-21 PROCEDURE — 99284 EMERGENCY DEPT VISIT MOD MDM: CPT | Performed by: EMERGENCY MEDICINE

## 2021-01-21 PROCEDURE — 87591 N.GONORRHOEAE DNA AMP PROB: CPT | Performed by: EMERGENCY MEDICINE

## 2021-01-21 RX ORDER — FLUCONAZOLE 150 MG/1
150 TABLET ORAL ONCE
Status: COMPLETED | OUTPATIENT
Start: 2021-01-21 | End: 2021-01-21

## 2021-01-21 RX ORDER — FLUCONAZOLE 150 MG/1
150 TABLET ORAL ONCE
Qty: 1 TABLET | Refills: 0 | Status: SHIPPED | OUTPATIENT
Start: 2021-01-24 | End: 2021-01-24

## 2021-01-21 RX ADMIN — FLUCONAZOLE 150 MG: 150 TABLET ORAL at 15:06

## 2021-01-21 NOTE — ED ATTENDING ATTESTATION
1/21/2021  IJulius MD, saw and evaluated the patient  I have discussed the patient with the resident/non-physician practitioner and agree with the resident's/non-physician practitioner's findings, Plan of Care, and MDM as documented in the resident's/non-physician practitioner's note, except where noted  All available labs and Radiology studies were reviewed  I was present for key portions of any procedure(s) performed by the resident/non-physician practitioner and I was immediately available to provide assistance  At this point I agree with the current assessment done in the Emergency Department  I have conducted an independent evaluation of this patient a history and physical is as follows: This is a 30 y o  old female who presents to the ED for evaluation of vaginal spotting , had it during last period as well  Clear discharge, r sided pelvic pain  Is sexually active  No STI hx, neg home preg  No urinary symptoms  VS and nursing notes reviewed  General: Appears in NAD, awake, alert, speaking normally in full sentences  Well-nourished, well-developed  Appears stated age  Head: Normocephalic, atraumatic  Eyes: EOMI  Vision grossly normal  No subconjunctival hemorrhages or occular discharge noted  Symmetrical lids  ENT: Atraumatic external nose and ears  No stridor  Normal phonation  No drooling  Normal swallowing  Neck: No JVD  FROM  No goiter  CV: No pallor  Normal rate  Lungs: No tachypnea  No respiratory distress  MSK: Moving all extremities equally, no peripheral edema  ABD: Soft, nontender  Pelvic: per resident note  Skin: Dry, intact  No cyanosis  Neuro: Awake, alert, GCS15  CN II-XII grossly intact  Grossly normal gait  Psychiatric/Behavioral: Appropriate mood and affect  A/P: This is a 30 y o  female who presents to the ED for evaluation of vaginal stotting, discharge  We will do a pelvic, check sti, UA, Upreg  Refer to [facility]      ED Course       Critical Care Time  Procedures

## 2021-01-22 LAB
C TRACH DNA SPEC QL NAA+PROBE: NEGATIVE
N GONORRHOEA DNA SPEC QL NAA+PROBE: NEGATIVE

## 2021-01-23 NOTE — ED PROVIDER NOTES
History  Chief Complaint   Patient presents with    Vaginal Bleeding     had menses last week then started bleeding again a few days later  states this happened last month but does not have medical iinsurance to see a GYN     Patient is a 68-year-old female that presents for evaluation of vaginal bleeding and discharge  Patient says that starting last month, she had some abnormal vaginal spotting after her menstrual period  Patient says that her last menstrual period was about a week late and admitted several days ago  Patient says that she has been having some intermittent vaginal spotting and clear vaginal discharge since that time  She says that she has been having profuse clear/whitish discharge that is not foul-smelling per the patient  She also endorses some right-sided pelvic pain associated with her symptoms  Patient is not on any birth control and currently sexually active with 1 male partner  Previous history of trichomoniasis remotely  Patient endorses nausea with no vomiting  No chest pain or dyspnea noted  Patient has not taken anything for her symptoms  Patient recently placed on penicillin secondary to strep throat infection  Prior to Admission Medications   Prescriptions Last Dose Informant Patient Reported?  Taking?   folic acid (FOLVITE) 112 MCG tablet 1/21/2021 at Unknown time Self Yes Yes   Sig: Take 2 tablets by mouth daily    lamoTRIgine (LaMICtal) 100 mg tablet 1/21/2021 at Unknown time  No Yes   Sig: Take 1 tablet (100 mg total) by mouth 2 (two) times a day      Facility-Administered Medications: None       Past Medical History:   Diagnosis Date    Allergic to peanuts     Anxiety     Asthma     Depression     Seizures (HCC)        Past Surgical History:   Procedure Laterality Date    INSERTION OF INTRAUTERINE DEVICE (IUD)         Family History   Problem Relation Age of Onset   [employer] Mother     Asthma Son     Diabetes Maternal Grandfather     Hypertension Maternal Grandfather     Asthma Other     Asthma Sister      I have reviewed and agree with the history as documented  E-Cigarette/Vaping     E-Cigarette/Vaping Substances     Social History     Tobacco Use    Smoking status: Former Smoker    Smokeless tobacco: Never Used   Substance Use Topics    Alcohol use: Yes     Comment: socially     Drug use: No        Review of Systems   Constitutional: Negative for chills, diaphoresis, fatigue and fever  HENT: Negative for facial swelling, sore throat and trouble swallowing  Respiratory: Negative for cough, chest tightness, shortness of breath and wheezing  Cardiovascular: Negative for chest pain  Gastrointestinal: Positive for abdominal pain  Negative for abdominal distention, diarrhea, nausea and vomiting  Genitourinary: Positive for vaginal bleeding and vaginal discharge  Negative for dysuria  Musculoskeletal: Negative for back pain, neck pain and neck stiffness  Skin: Negative for color change, pallor, rash and wound  Neurological: Negative for weakness, light-headedness and numbness  Psychiatric/Behavioral: Negative for agitation  All other systems reviewed and are negative  Physical Exam  ED Triage Vitals [01/21/21 1303]   Temperature Pulse Respirations Blood Pressure SpO2   98 1 °F (36 7 °C) 88 16 127/97 98 %      Temp src Heart Rate Source Patient Position - Orthostatic VS BP Location FiO2 (%)   -- -- -- -- --      Pain Score       --             Orthostatic Vital Signs  Vitals:    01/21/21 1303   BP: 127/97   Pulse: 88       Physical Exam  Vitals signs reviewed  Constitutional:       General: She is not in acute distress  Appearance: She is well-developed  HENT:      Head: Normocephalic  Eyes:      Pupils: Pupils are equal, round, and reactive to light  Neck:      Musculoskeletal: Normal range of motion and neck supple  Cardiovascular:      Rate and Rhythm: Normal rate and regular rhythm        Heart sounds: Normal heart sounds  Pulmonary:      Effort: Pulmonary effort is normal       Breath sounds: Normal breath sounds  Abdominal:      General: Bowel sounds are normal  There is no distension  Palpations: Abdomen is soft  Tenderness: There is abdominal tenderness  There is no guarding  Comments: Mild right-sided pelvic tenderness without rebound tenderness or guarding  Genitourinary:     Comments: Patient with whitish discharge from slightly erythematous cervix  No significant cervical motion tenderness on exam   Musculoskeletal: Normal range of motion  General: No tenderness or deformity  Skin:     General: Skin is warm and dry  Capillary Refill: Capillary refill takes less than 2 seconds  Neurological:      Mental Status: She is alert and oriented to person, place, and time  Cranial Nerves: No cranial nerve deficit  Sensory: No sensory deficit  Psychiatric:         Behavior: Behavior normal          Thought Content: Thought content normal          Judgment: Judgment normal          ED Medications  Medications   fluconazole (DIFLUCAN) tablet 150 mg (150 mg Oral Given 1/21/21 1506)       Diagnostic Studies  Results Reviewed     Procedure Component Value Units Date/Time    Chlamydia/GC amplified DNA by PCR [[id number]]  (Normal) Collected: 01/21/21 1507    Lab Status: Final result Specimen: Cervix Updated: 01/22/21 1256     N gonorrhoeae, DNA Probe Negative     Chlamydia trachomatis, DNA Probe Negative    Narrative:      Test performed using PCR amplification of target DNA  This test is intended as an aid in the diagnosis of Chlamydial and gonococcal disease  This test has not been evaluated in patients younger than 15years of age and is not recommended for evaluation of suspected sexual abuse  Additional testing is recommended when the results do not correlate with clinical signs and symptoms        Urine Microscopic [[id number]]  (Abnormal) Collected: 01/21/21 1502    Lab Status: Final result Specimen: Urine, Clean Catch Updated: 01/21/21 1612     RBC, UA 2-4 /hpf      WBC, UA 4-10 /hpf      Epithelial Cells None Seen /hpf      Bacteria, UA Occasional /hpf      Hyaline Casts, UA None Seen /lpf     POCT pregnancy, urine [[id number]]  (Normal) Resulted: 01/21/21 1507    Lab Status: Final result Updated: 01/21/21 1507     EXT PREG TEST UR (Ref: Negative) negative     Control valid    POCT urinalysis dipstick [[id number]]  (Normal) Resulted: 01/21/21 1506    Lab Status: Final result Updated: 01/21/21 1507     Color, UA see results    Urine Macroscopic, POC [[id number]]  (Abnormal) Collected: 01/21/21 1502    Lab Status: Final result Specimen: Urine Updated: 01/21/21 1504     Color, UA Yellow     Clarity, UA Clear     pH, UA 6 0     Leukocytes, UA Small     Nitrite, UA Negative     Protein, UA Negative mg/dl      Glucose, UA Negative mg/dl      Ketones, UA 80 (3+) mg/dl      Urobilinogen, UA 1 0 E U /dl      Bilirubin, UA Interference- unable to analyze     Blood, UA Moderate     Specific [place], UA 1 020    Narrative:      CLINITEK RESULT                 No orders to display         Procedures  Procedures      ED Course                             SBIRT 22yo+      Most Recent Value   SBIRT (22 yo +)   In order to provide better care to our patients, we are screening all of our patients for alcohol and drug use  Would it be okay to ask you these screening questions? No Filed at: 01/21/2021 1346                MDM  Number of Diagnoses or Management Options  Candidiasis:   Vaginal bleeding:   Vaginal discharge:   Diagnosis management comments: Patient is a 52-year-old female who presents with vaginal spotting and clear/white vaginal discharge  Pelvic exam consistent with candidiasis  Recent antibiotic use  Patient will be treated with Diflucan  Patient swab for chlamydia gonorrhea but suspicion of cervicitis is low at this time    Patient was advised to follow-up with gynecology return to care she was any new or worsening symptoms  Disposition  Final diagnoses:   Candidiasis   Vaginal bleeding   Vaginal discharge     Time reflects when diagnosis was documented in both MDM as applicable and the Disposition within this note     Time User Action Codes Description Comment    1/21/2021  2:54 PM Francheska Mages Add [B37 9] Candidiasis     1/21/2021  2:54 PM Francheska Mages Add [N93 9] Vaginal bleeding     1/21/2021  2:54 PM Francheska Mages Add [N89 8] Vaginal discharge       ED Disposition     ED Disposition Condition Date/Time Comment    Discharge Stable Thu Jan 21, 2021  2:52 PM [address] discharge to home/self care  Follow-up Information     Follow up With Specialties Details Why Contact Info Additional [address] [facility]   [address] [id number]  [address] [facility], [address] [facility] [address], [place], [address] [place] [address] 34 [facility] Emergency Department Emergency Medicine  If symptoms worsen [address]  [address] [facility] 64 [facility] Emergency Department, [address] [facility] 20, [place], [place], [facility]          Discharge Medication List as of 1/21/2021  2:55 PM      START taking these medications    Details   fluconazole (DIFLUCAN) 150 mg tablet Take 1 tablet (150 mg total) by mouth once for 1 dose, Starting Sun 1/24/2021, Print         CONTINUE these medications which have NOT CHANGED    Details   folic acid (FOLVITE) 544 MCG tablet Take 2 tablets by mouth daily , Historical Med      lamoTRIgine (LaMICtal) 100 mg tablet Take 1 tablet (100 mg total) by mouth 2 (two) times a day, Starting Wed 9/2/2020, Normal           No discharge procedures on file  PDMP Review     None           ED Provider  Attending physically available and evaluated Betsy Acosta I managed the patient along with the ED Attending      Electronically Signed by         Ryan Sheikh MD  01/23/21 2755

## 2021-02-19 ENCOUNTER — HOSPITAL ENCOUNTER (EMERGENCY)
Facility: HOSPITAL | Age: 26
Discharge: HOME | End: 2021-02-19
Attending: EMERGENCY MEDICINE | Admitting: EMERGENCY MEDICINE
Payer: COMMERCIAL

## 2021-02-19 VITALS
HEART RATE: 98 BPM | HEIGHT: 60 IN | BODY MASS INDEX: 25.52 KG/M2 | TEMPERATURE: 98.3 F | DIASTOLIC BLOOD PRESSURE: 77 MMHG | RESPIRATION RATE: 20 BRPM | WEIGHT: 130 LBS | SYSTOLIC BLOOD PRESSURE: 120 MMHG

## 2021-02-19 DIAGNOSIS — N93.9 VAGINAL SPOTTING: Primary | ICD-10-CM

## 2021-02-19 DIAGNOSIS — R51.9 HEADACHE: ICD-10-CM

## 2021-02-19 DIAGNOSIS — Z3A.01 LESS THAN 8 WEEKS GESTATION OF PREGNANCY: ICD-10-CM

## 2021-02-19 DIAGNOSIS — R11.0 NAUSEA: ICD-10-CM

## 2021-02-19 LAB
ABO GROUP BLD: NORMAL
ALBUMIN SERPL BCP-MCNC: 3.8 G/DL (ref 3.5–5)
ALP SERPL-CCNC: 67 U/L (ref 46–116)
ALT SERPL W P-5'-P-CCNC: 21 U/L (ref 12–78)
ANION GAP SERPL CALCULATED.3IONS-SCNC: 6 MMOL/L (ref 4–13)
AST SERPL W P-5'-P-CCNC: 13 U/L (ref 5–45)
B-HCG SERPL-ACNC: ABNORMAL MIU/ML
BASOPHILS # BLD AUTO: 0.05 THOUSANDS/ΜL (ref 0–0.1)
BASOPHILS NFR BLD AUTO: 1 % (ref 0–1)
BILIRUB SERPL-MCNC: 0.62 MG/DL (ref 0.2–1)
BUN SERPL-MCNC: 11 MG/DL (ref 5–25)
CALCIUM SERPL-MCNC: 9.2 MG/DL (ref 8.3–10.1)
CHLORIDE SERPL-SCNC: 109 MMOL/L (ref 100–108)
CO2 SERPL-SCNC: 24 MMOL/L (ref 21–32)
CREAT SERPL-MCNC: 0.43 MG/DL (ref 0.6–1.3)
EOSINOPHIL # BLD AUTO: 0.23 THOUSAND/ΜL (ref 0–0.61)
EOSINOPHIL NFR BLD AUTO: 3 % (ref 0–6)
ERYTHROCYTE [DISTWIDTH] IN BLOOD BY AUTOMATED COUNT: 12.4 % (ref 11.6–15.1)
GFR SERPL CREATININE-BSD FRML MDRD: 140 ML/MIN/1.73SQ M
GLUCOSE SERPL-MCNC: 81 MG/DL (ref 65–140)
HCT VFR BLD AUTO: 36.2 % (ref 34.8–46.1)
HGB BLD-MCNC: 12.3 G/DL (ref 11.5–15.4)
IMM GRANULOCYTES # BLD AUTO: 0.02 THOUSAND/UL (ref 0–0.2)
IMM GRANULOCYTES NFR BLD AUTO: 0 % (ref 0–2)
LYMPHOCYTES # BLD AUTO: 1.88 THOUSANDS/ΜL (ref 0.6–4.47)
LYMPHOCYTES NFR BLD AUTO: 23 % (ref 14–44)
MCH RBC QN AUTO: 30.7 PG (ref 26.8–34.3)
MCHC RBC AUTO-ENTMCNC: 34 G/DL (ref 31.4–37.4)
MCV RBC AUTO: 90 FL (ref 82–98)
MONOCYTES # BLD AUTO: 0.65 THOUSAND/ΜL (ref 0.17–1.22)
MONOCYTES NFR BLD AUTO: 8 % (ref 4–12)
NEUTROPHILS # BLD AUTO: 5.47 THOUSANDS/ΜL (ref 1.85–7.62)
NEUTS SEG NFR BLD AUTO: 65 % (ref 43–75)
NRBC BLD AUTO-RTO: 0 /100 WBCS
PLATELET # BLD AUTO: 225 THOUSANDS/UL (ref 149–390)
PMV BLD AUTO: 10.3 FL (ref 8.9–12.7)
POTASSIUM SERPL-SCNC: 3.7 MMOL/L (ref 3.5–5.3)
PROT SERPL-MCNC: 7.2 G/DL (ref 6.4–8.2)
RBC # BLD AUTO: 4.01 MILLION/UL (ref 3.81–5.12)
RH BLD: POSITIVE
SODIUM SERPL-SCNC: 139 MMOL/L (ref 136–145)
WBC # BLD AUTO: 8.3 THOUSAND/UL (ref 4.31–10.16)

## 2021-02-19 PROCEDURE — 99284 EMERGENCY DEPT VISIT MOD MDM: CPT | Performed by: EMERGENCY MEDICINE

## 2021-02-19 PROCEDURE — 99284 EMERGENCY DEPT VISIT MOD MDM: CPT

## 2021-02-19 PROCEDURE — 84702 CHORIONIC GONADOTROPIN TEST: CPT | Performed by: EMERGENCY MEDICINE

## 2021-02-19 PROCEDURE — 80053 COMPREHEN METABOLIC PANEL: CPT | Performed by: EMERGENCY MEDICINE

## 2021-02-19 PROCEDURE — 96374 THER/PROPH/DIAG INJ IV PUSH: CPT

## 2021-02-19 PROCEDURE — 36415 COLL VENOUS BLD VENIPUNCTURE: CPT | Performed by: EMERGENCY MEDICINE

## 2021-02-19 PROCEDURE — 85025 COMPLETE CBC W/AUTO DIFF WBC: CPT | Performed by: EMERGENCY MEDICINE

## 2021-02-19 PROCEDURE — 76815 OB US LIMITED FETUS(S): CPT | Performed by: EMERGENCY MEDICINE

## 2021-02-19 PROCEDURE — 86900 BLOOD TYPING SEROLOGIC ABO: CPT | Performed by: EMERGENCY MEDICINE

## 2021-02-19 PROCEDURE — 86901 BLOOD TYPING SEROLOGIC RH(D): CPT | Performed by: EMERGENCY MEDICINE

## 2021-02-19 RX ORDER — ONDANSETRON 4 MG/1
4 TABLET, FILM COATED ORAL EVERY 6 HOURS
Qty: 12 TABLET | Refills: 0 | Status: SHIPPED | OUTPATIENT
Start: 2021-02-19

## 2021-02-19 RX ORDER — ACETAMINOPHEN 325 MG/1
975 TABLET ORAL ONCE
Status: COMPLETED | OUTPATIENT
Start: 2021-02-19 | End: 2021-02-19

## 2021-02-19 RX ORDER — ONDANSETRON 2 MG/ML
4 INJECTION INTRAMUSCULAR; INTRAVENOUS ONCE
Status: COMPLETED | OUTPATIENT
Start: 2021-02-19 | End: 2021-02-19

## 2021-02-19 RX ADMIN — ACETAMINOPHEN 975 MG: 325 TABLET ORAL at 11:32

## 2021-02-19 RX ADMIN — ONDANSETRON 4 MG: 2 INJECTION INTRAMUSCULAR; INTRAVENOUS at 11:32

## 2021-02-19 NOTE — ED ATTENDING ATTESTATION
2/19/2021  IFelix MD, saw and evaluated the patient  I have discussed the patient with the resident/non-physician practitioner and agree with the resident's/non-physician practitioner's findings, Plan of Care, and MDM as documented in the resident's/non-physician practitioner's note, except where noted  All available labs and Radiology studies were reviewed  I was present for key portions of any procedure(s) performed by the resident/non-physician practitioner and I was immediately available to provide assistance  At this point I agree with the current assessment done in the Emergency Department    I have conducted an independent evaluation of this patient a history and physical is as follows:  Vag spotting with vag bleeding  Just a little   States lmp about 6 weeks ago   No fever   Has some vomiting   No diarrhea   No abd pain   Exam  Looks well  Lungs clear heart rrr no m abd soft nt nd pos    ED Course         Critical Care Time  Procedures

## 2021-02-19 NOTE — ED NOTES
Pt calling c/o "blood when I wiped after the ultrasound " Will notify the resident        Carmie Curling, RN  02/19/21 8204

## 2021-02-19 NOTE — DISCHARGE INSTRUCTIONS
You have been seen for vaginal spotting during pregnancy  You should return to the ED if you develop worsening bleeding, abdominal pain, or other worsening symptoms  Follow up with OBGYN in 3 days to repeat the HCG test  Tylenol is safe in pregnancy for headaches  Zofran has been sent to your pharmacy for nausea  If you have persistent vomiting and are unable to eat, return to the ED

## 2021-02-19 NOTE — ED RE-EVALUATION NOTE
I was present for the bedside ultrasound  Images were unable to be transmitted into PACS, but are available in the ultrasound machine and demonstrate a early intrauterine pregnancy with a yolk sac within the gestational sac and a small subchorionic hemorrhage                       Francisca Altman MD  02/19/21 5941

## 2021-02-19 NOTE — ED PROVIDER NOTES
History  Chief Complaint   Patient presents with    Vaginal Bleeding - Pregnant     Pt "I recently found out I was pregnant and I have been having spotting for the last 3 days with headaches and nausea  I honestly have not been eating a whole lot lately "` PT denies CP and SOB     47-year-old  female who is 5 weeks pregnant presents with vaginal spotting  Patient recently found out she was pregnant due to a positive urine pregnancy test   Her last menstrual period was 5 weeks ago  She has not yet seen OB  Patient says that 3 days ago she had some mucousy discharge  Yesterday she had bloody discharge when she wiped in the morning  This morning the same thing happened  Patient is not having enough bloody discharge that she needs to wear a pad  She says it only happens when she wipes and is only in the morning  She is not having any abdominal pain  She also notes that she has a bilateral, tension-type headache  She also feels nauseous, but has not vomited  She has not tried any medication at home  She notes that she has not been eating a whole lot for the past few days either  She had no complications with her previous pregnancy  Prior to Admission Medications   Prescriptions Last Dose Informant Patient Reported? Taking?    Prenatal w/o A Vit-Fe Fum-FA (PRENATA PO)   Yes Yes   Sig: Take by mouth   folic acid (FOLVITE) 459 MCG tablet  Self Yes Yes   Sig: Take 2 tablets by mouth daily    lamoTRIgine (LaMICtal) 100 mg tablet   No Yes   Sig: Take 1 tablet (100 mg total) by mouth 2 (two) times a day      Facility-Administered Medications: None       Past Medical History:   Diagnosis Date    Allergic to peanuts     Anxiety     Asthma     Depression     Seizures (HCC)        Past Surgical History:   Procedure Laterality Date    INSERTION OF INTRAUTERINE DEVICE (IUD)         Family History   Problem Relation Age of Onset    Migraines Mother     Asthma Son     Diabetes Maternal Grandfather     Hypertension Maternal Grandfather     Asthma Other     Asthma Sister      I have reviewed and agree with the history as documented  E-Cigarette/Vaping     E-Cigarette/Vaping Substances     Social History     Tobacco Use    Smoking status: Former Smoker    Smokeless tobacco: Never Used   Substance Use Topics    Alcohol use: Yes     Frequency: Monthly or less     Comment: socially     Drug use: No        Review of Systems   Constitutional: Negative for chills, fatigue and fever  HENT: Negative for congestion, rhinorrhea and sore throat  Eyes: Negative for pain and redness  Respiratory: Negative for cough, chest tightness, shortness of breath and wheezing  Cardiovascular: Negative for chest pain and palpitations  Gastrointestinal: Positive for nausea  Negative for abdominal pain, diarrhea and vomiting  Endocrine: Negative  Genitourinary: Positive for vaginal bleeding and vaginal discharge  Negative for difficulty urinating, hematuria and pelvic pain  Musculoskeletal: Negative for back pain and myalgias  Skin: Negative for pallor and rash  Allergic/Immunologic: Negative  Neurological: Positive for headaches  Negative for dizziness, weakness and light-headedness  Hematological: Negative  Physical Exam  ED Triage Vitals [02/19/21 1102]   Temperature Pulse Respirations Blood Pressure SpO2   98 3 °F (36 8 °C) 98 20 120/77 --      Temp Source Heart Rate Source Patient Position - Orthostatic VS BP Location FiO2 (%)   Oral Monitor Sitting Right arm --      Pain Score       7             Orthostatic Vital Signs  Vitals:    02/19/21 1102   BP: 120/77   Pulse: 98   Patient Position - Orthostatic VS: Sitting       Physical Exam  Vitals signs and nursing note reviewed  Constitutional:       General: She is not in acute distress  Appearance: Normal appearance  She is not ill-appearing  HENT:      Head: Normocephalic and atraumatic     Eyes:      Conjunctiva/sclera: Conjunctivae normal    Neck:      Musculoskeletal: Normal range of motion and neck supple  Cardiovascular:      Rate and Rhythm: Normal rate and regular rhythm  Heart sounds: No murmur  Pulmonary:      Effort: Pulmonary effort is normal  No respiratory distress  Breath sounds: Normal breath sounds  No wheezing, rhonchi or rales  Abdominal:      General: Abdomen is flat  There is no distension  Palpations: Abdomen is soft  Tenderness: There is no abdominal tenderness  There is no guarding  Musculoskeletal: Normal range of motion  Skin:     General: Skin is warm and dry  Neurological:      General: No focal deficit present  Mental Status: She is alert and oriented to person, place, and time           ED Medications  Medications   acetaminophen (TYLENOL) tablet 975 mg (975 mg Oral Given 2/19/21 1132)   ondansetron (ZOFRAN) injection 4 mg (4 mg Intravenous Given 2/19/21 1132)       Diagnostic Studies  Results Reviewed     Procedure Component Value Units Date/Time    hCG, quantitative [[id number]]  (Abnormal) Collected: 02/19/21 1133    Lab Status: Final result Specimen: Blood from Arm, Right Updated: 02/19/21 1231     HCG, Quant 21,025 mIU/mL     Narrative:       Expected Ranges:     Approximate               Approximate HCG  Gestation age          Concentration ( mIU/mL)  _____________          ______________________   Jovanny Button                      HCG values  0 2-1                       5-50  1-2                           2-3                         100-5000  3-4                         500-42264  4-5                         1000-65820  5-6                         45763-196308  6-8                         36222-602272  8-12                        53716-869578      Comprehensive metabolic panel [[id number]]  (Abnormal) Collected: 02/19/21 1133    Lab Status: Final result Specimen: Blood from Arm, Right Updated: 02/19/21 1210     Sodium 139 mmol/L      Potassium 3 7 mmol/L      Chloride 109 mmol/L      CO2 24 mmol/L      ANION GAP 6 mmol/L      BUN 11 mg/dL      Creatinine 0 43 mg/dL      Glucose 81 mg/dL      Calcium 9 2 mg/dL      AST 13 U/L      ALT 21 U/L      Alkaline Phosphatase 67 U/L      Total Protein 7 2 g/dL      Albumin 3 8 g/dL      Total Bilirubin 0 62 mg/dL      eGFR 140 ml/min/1 73sq m     Narrative:      National Kidney Disease Foundation guidelines for Chronic Kidney Disease (CKD):     Stage 1 with normal or high GFR (GFR > 90 mL/min/1 73 square meters)    Stage 2 Mild CKD (GFR = 60-89 mL/min/1 73 square meters)    Stage 3A Moderate CKD (GFR = 45-59 mL/min/1 73 square meters)    Stage 3B Moderate CKD (GFR = 30-44 mL/min/1 73 square meters)    Stage 4 Severe CKD (GFR = 15-29 mL/min/1 73 square meters)    Stage 5 End Stage CKD (GFR <15 mL/min/1 73 square meters)  Note: GFR calculation is accurate only with a steady state creatinine    CBC and differential [[id number]] Collected: 02/19/21 1132    Lab Status: Final result Specimen: Blood from Arm, Right Updated: 02/19/21 1144     WBC 8 30 Thousand/uL      RBC 4 01 Million/uL      Hemoglobin 12 3 g/dL      Hematocrit 36 2 %      MCV 90 fL      MCH 30 7 pg      MCHC 34 0 g/dL      RDW 12 4 %      MPV 10 3 fL      Platelets 285 Thousands/uL      nRBC 0 /100 WBCs      Neutrophils Relative 65 %      Immat GRANS % 0 %      Lymphocytes Relative 23 %      Monocytes Relative 8 %      Eosinophils Relative 3 %      Basophils Relative 1 %      Neutrophils Absolute 5 47 Thousands/µL      Immature Grans Absolute 0 02 Thousand/uL      Lymphocytes Absolute 1 88 Thousands/µL      Monocytes Absolute 0 65 Thousand/µL      Eosinophils Absolute 0 23 Thousand/µL      Basophils Absolute 0 05 Thousands/µL                  No orders to display         Procedures  POC Pelvic US    Date/Time: 2/19/2021 12:30 PM  Performed by: Petra Will DO  Authorized by: Petra Will DO     Patient location:  ED  Other Assisting Provider: Yes (comment) (Dr Mary Grace Garcia) Procedure details:     Exam Type:  Diagnostic    Indications: evaluate for IUP and pregnant with vaginal bleeding      Assessment for: confirm intrauterine pregnancy      Technique:  Transabdominal obstetric (HCG+) exam and transvaginal obstetric (HCG+) exam    Views obtained: left adnexa, right adnexa, uterus (transverse and sagittal) and pouch of Patel      Image quality: diagnostic      Image availability:  Images available in PACS  Uterine findings:     Endometrial stripe: identified      Intrauterine pregnancy: identified      Single gestation: identified      Yolk sac: identified      Fetal pole: not identified      Fetal heart rate: not identified    Right adnexa findings:     Right ovary:  Visualized    Right tube patency:  Normal  Left adnexa findings:     Left ovary:  Visualized    Left tube patency:  Normal  Other findings:     Free pelvic fluid: not identified      Free peritoneal fluid: not identified    Interpretation:     Ultrasound impressions: normal      Pregnancy findings: intrauterine pregnancy (IUP)    Comments:      Small subchorionic hemorrhage          ED Course  ED Course as of Feb 19 1347   Fri Feb 19, 2021   1300 Pt feels better after taking zofran  Will discharge her with rx to use if needed                                SBIRT 20yo+      Most Recent Value   SBIRT (24 yo +)   In order to provide better care to our patients, we are screening all of our patients for alcohol and drug use  Would it be okay to ask you these screening questions? Yes Filed at: 02/19/2021 1149   Initial Alcohol Screen: US AUDIT-C    1  How often do you have a drink containing alcohol?  0 Filed at: 02/19/2021 1149   2  How many drinks containing alcohol do you have on a typical day you are drinking? 0 Filed at: 02/19/2021 1149   3a  Male UNDER 65: How often do you have five or more drinks on one occasion? 0 Filed at: 02/19/2021 1149   3b  FEMALE Any Age, or MALE 65+:  How often do you have 4 or more drinks on one occassion? 0 Filed at: 2021 1149   Audit-C Score  0 Filed at: 2021 1149   MEKHI: How many times in the past year have you    Used an illegal drug or used a prescription medication for non-medical reasons? Never Filed at: 2021 1149                [facility]  Number of Diagnoses or Management Options  Headache: established and improving  Less than 8 weeks gestation of pregnancy: new and requires workup  Nausea: established and improving  Vaginal spotting: new and requires workup  Diagnosis management comments: 52-year-old  female who is 5 weeks pregnant presents with bloody vaginal discharge for 3 days  Will give her Tylenol for her headache and Zofran for her nausea  Will get labs with a quantitative hCG  Will check a bedside ultrasound and do a transvaginal if fetal heart tones cannot be found  Amount and/or Complexity of Data Reviewed  Clinical lab tests: ordered and reviewed  Tests in the radiology section of CPT®: ordered and reviewed  Review and summarize past medical records: yes  Discuss the patient with other providers: yes    Risk of Complications, Morbidity, and/or Mortality  Presenting problems: low  Diagnostic procedures: low  Management options: low    Patient Progress  Patient progress: improved    Patient felt better after receiving Zofran and Tylenol  Patient's transvaginal ultrasound showed a yolk sac  There was a small subchorionic hemorrhage  These results were explained to patient and her partner  Patient was told that she needs to follow up with OB in 3 days to repeat the quantitative beta HCG test   Return precautions were given  Patient was given a prescription for Zofran to use as needed for nausea      Disposition  Final diagnoses:   Vaginal spotting   Less than 8 weeks gestation of pregnancy   Nausea   Headache     Time reflects when diagnosis was documented in both MDM as applicable and the Disposition within this note     Time User Action Codes Description Comment    2/19/2021  1:26 PM Larry Pichardo Add [N93 9] Vaginal spotting     2/19/2021  1:26 PM Karissa Long Add [T8C 95] Less than 8 weeks gestation of pregnancy     2/19/2021  1:27 PM Karissa Long Add [R11 0] Nausea     2/19/2021  1:27 PM Larry Pichardo Add [R51 9] Headache       ED Disposition     ED Disposition Condition Date/Time Comment    Discharge Good Fri Feb 19, 2021  1:26 PM [address] discharge to home/self care  Follow-up Information     Follow up With Specialties Details [facility] In 3 days to repeat [facility] test [facility] [address] [phone]          Patient's Medications   Discharge Prescriptions    ONDANSETRON (ZOFRAN) 4 MG TABLET    Take 1 tablet (4 mg total) by mouth every 6 (six) hours       Start Date: 2/19/2021 End Date: --       Order Dose: 4 mg       Quantity: 12 tablet    Refills: 0     No discharge procedures on file  PDMP Review     None           ED Provider  Attending physically available and evaluated Aimee Darene Dakin I managed the patient along with the ED Attending      Electronically Signed by         DO Neli  02/19/21 3470

## 2021-02-20 ENCOUNTER — HOSPITAL ENCOUNTER (EMERGENCY)
Facility: HOSPITAL | Age: 26
Discharge: HOME | End: 2021-02-20
Attending: EMERGENCY MEDICINE
Payer: COMMERCIAL

## 2021-02-20 VITALS
BODY MASS INDEX: 25.54 KG/M2 | HEIGHT: 60 IN | DIASTOLIC BLOOD PRESSURE: 61 MMHG | WEIGHT: 130.07 LBS | RESPIRATION RATE: 20 BRPM | SYSTOLIC BLOOD PRESSURE: 115 MMHG | OXYGEN SATURATION: 97 % | TEMPERATURE: 98.2 F | HEART RATE: 87 BPM

## 2021-02-20 DIAGNOSIS — G40.909 EPILEPSY (HCC): ICD-10-CM

## 2021-02-20 DIAGNOSIS — O20.0 THREATENED ABORTION: Primary | ICD-10-CM

## 2021-02-20 LAB
B-HCG SERPL-ACNC: ABNORMAL MIU/ML
BACTERIA UR QL AUTO: ABNORMAL /HPF
BILIRUB UR QL STRIP: NEGATIVE
CLARITY UR: CLEAR
COLOR UR: YELLOW
EXT PREG TEST URINE: POSITIVE
EXT. CONTROL ED NAV: ABNORMAL
GLUCOSE UR STRIP-MCNC: NEGATIVE MG/DL
HGB UR QL STRIP.AUTO: ABNORMAL
HYALINE CASTS #/AREA URNS LPF: ABNORMAL /LPF
KETONES UR STRIP-MCNC: ABNORMAL MG/DL
LEUKOCYTE ESTERASE UR QL STRIP: NEGATIVE
NITRITE UR QL STRIP: NEGATIVE
NON-SQ EPI CELLS URNS QL MICRO: ABNORMAL /HPF
PH UR STRIP.AUTO: 6.5 [PH] (ref 4.5–8)
PROT UR STRIP-MCNC: NEGATIVE MG/DL
RBC #/AREA URNS AUTO: ABNORMAL /HPF
SP GR UR STRIP.AUTO: >=1.03 (ref 1–1.03)
UROBILINOGEN UR QL STRIP.AUTO: 0.2 E.U./DL
WBC #/AREA URNS AUTO: ABNORMAL /HPF

## 2021-02-20 PROCEDURE — 84702 CHORIONIC GONADOTROPIN TEST: CPT | Performed by: EMERGENCY MEDICINE

## 2021-02-20 PROCEDURE — 87086 URINE CULTURE/COLONY COUNT: CPT

## 2021-02-20 PROCEDURE — 99284 EMERGENCY DEPT VISIT MOD MDM: CPT

## 2021-02-20 PROCEDURE — 36415 COLL VENOUS BLD VENIPUNCTURE: CPT | Performed by: EMERGENCY MEDICINE

## 2021-02-20 PROCEDURE — 81025 URINE PREGNANCY TEST: CPT | Performed by: EMERGENCY MEDICINE

## 2021-02-20 PROCEDURE — 99284 EMERGENCY DEPT VISIT MOD MDM: CPT | Performed by: EMERGENCY MEDICINE

## 2021-02-20 PROCEDURE — 81001 URINALYSIS AUTO W/SCOPE: CPT

## 2021-02-20 NOTE — ED NOTES
Pt seen yesterday; pt stated "I am bleeding more now, like filling a pad   And I am having a lot of cramping as well"      David Sheikh, TRU  02/20/21 3174

## 2021-02-20 NOTE — ED PROVIDER NOTES
History  Chief Complaint   Patient presents with    Vaginal Bleeding - Pregnant     PT "I am bleeding more now then yesterday  Its actually a pad full now and there are clots" Pt denies CP and SOB     17-year-old female  reportedly 5 weeks pregnant by dates presents with vaginal bleeding  Accompanied by male partner  Patient endorses light spotting for the past couple of days with wiping however today noted increased vaginal bleeding soaking through an entire pad  Also noted some clot but denies passage of fetal parts  Some associated pelvic cramping  Otherwise feeling well  She presented to the ED yesterday where transvaginal ultrasound demonstrated intrauterine yolk sac within gestational sac, small subchorionic hemorrhage  Hcg yesterday   Advised to follow up with OB on Monday for repeat hcg  4year old healthy child at home with uncomplicated gestation  Prior to Admission Medications   Prescriptions Last Dose Informant Patient Reported? Taking? Prenatal w/o A Vit-Fe Fum-FA (PRENATA PO)   Yes No   Sig: Take by mouth   folic acid (FOLVITE) 889 MCG tablet  Self Yes No   Sig: Take 2 tablets by mouth daily    lamoTRIgine (LaMICtal) 100 mg tablet   No No   Sig: Take 1 tablet (100 mg total) by mouth 2 (two) times a day   ondansetron (ZOFRAN) 4 mg tablet   No No   Sig: Take 1 tablet (4 mg total) by mouth every 6 (six) hours      Facility-Administered Medications: None       Past Medical History:   Diagnosis Date    Allergic to peanuts     Anxiety     Asthma     Depression     Seizures (HCC)        Past Surgical History:   Procedure Laterality Date    INSERTION OF INTRAUTERINE DEVICE (IUD)         Family History   Problem Relation Age of Onset    Migraines Mother     Asthma Son     Diabetes Maternal Grandfather     Hypertension Maternal Grandfather     Asthma Other     Asthma Sister      I have reviewed and agree with the history as documented      E-Cigarette/Vaping E-Cigarette/Vaping Substances     Social History     Tobacco Use    Smoking status: Former Smoker    Smokeless tobacco: Never Used   Substance Use Topics    Alcohol use: Yes     Frequency: Monthly or less     Comment: socially     Drug use: No        Review of Systems   Constitutional: Negative for chills and fever  HENT: Negative for ear pain, sinus pain and sore throat  Eyes: Negative for pain  Respiratory: Negative for shortness of breath  Cardiovascular: Negative for chest pain  Gastrointestinal: Negative for abdominal pain, blood in stool, diarrhea, nausea and vomiting  Genitourinary: Positive for pelvic pain and vaginal bleeding  Negative for difficulty urinating, flank pain, genital sores, vaginal discharge and vaginal pain  Musculoskeletal: Negative for back pain and neck pain  Neurological: Negative for headaches  All other systems reviewed and are negative  Physical Exam  ED Triage Vitals [02/20/21 1749]   Temperature Pulse Respirations Blood Pressure SpO2   98 2 °F (36 8 °C) 87 20 115/61 97 %      Temp Source Heart Rate Source Patient Position - Orthostatic VS BP Location FiO2 (%)   Oral Monitor Sitting Left arm --      Pain Score       5             Orthostatic Vital Signs  Vitals:    02/20/21 1749   BP: 115/61   Pulse: 87   Patient Position - Orthostatic VS: Sitting       Physical Exam  Vitals signs and nursing note reviewed  Constitutional:       General: She is not in acute distress  Appearance: Normal appearance  She is well-developed  She is not ill-appearing, toxic-appearing or diaphoretic  HENT:      Head: Normocephalic  Nose: Nose normal       Mouth/Throat:      Mouth: Mucous membranes are moist    Eyes:      General: No scleral icterus  Right eye: No discharge  Left eye: No discharge  Extraocular Movements: Extraocular movements intact        Conjunctiva/sclera: Conjunctivae normal    Neck:      Musculoskeletal: Normal range of motion and neck supple  No neck rigidity  Cardiovascular:      Rate and Rhythm: Normal rate  Pulmonary:      Effort: Pulmonary effort is normal  No respiratory distress  Breath sounds: Normal breath sounds  No stridor  Abdominal:      General: There is no distension  Palpations: Abdomen is soft  Tenderness: There is no abdominal tenderness  There is no guarding or rebound  Skin:     General: Skin is warm and dry  Capillary Refill: Capillary refill takes less than 2 seconds  Neurological:      Mental Status: She is alert and oriented to person, place, and time  Cranial Nerves: No cranial nerve deficit  Psychiatric:         Mood and Affect: Mood normal          Behavior: Behavior normal       Comments: Pleasant, cooperative    Tearful when discussing likelihood of miscarriage         ED Medications  Medications - No data to display    Diagnostic Studies  Results Reviewed     Procedure Component Value Units Date/Time    Quantitative hCG [[id number]]  (Abnormal) Collected: 02/20/21 1856    Lab Status: Final result Specimen: Blood from Arm, Left Updated: 02/20/21 1937     HCG, Quant 23,522 mIU/mL     Narrative:       Expected Ranges:     Approximate               Approximate HCG  Gestation age          Concentration ( mIU/mL)  _____________          ______________________   Tyrel Gallo                      HCG values  0 2-1                       5-50  1-2                           2-3                         100-5000  3-4                         500-70647  4-5                         1000-89501  5-6                         84527-193258  6-8                         65979-658518  8-12                        78060-127553      Urine Microscopic [[id number]]  (Abnormal) Collected: 02/20/21 1756    Lab Status: Final result Specimen: Urine, Clean Catch Updated: 02/20/21 1814     RBC, UA 2-4 /hpf      WBC, UA 4-10 /hpf      Epithelial Cells Occasional /hpf      Bacteria, UA None Seen /hpf Hyaline Casts, UA None Seen /lpf     POCT pregnancy, urine [[id number]]  (Abnormal) Resulted: 21    Lab Status: Final result Updated: 21     EXT PREG TEST UR (Ref: Negative) positive     Control valid    Urine culture [[id number]] Collected: 21    Lab Status: In process Specimen: Urine, Clean Catch Updated: 21    Urine Macroscopic, POC [[id number]]  (Abnormal) Collected: 21    Lab Status: Final result Specimen: Urine Updated: 21     Color, UA Yellow     Clarity, UA Clear     pH, UA 6 5     Leukocytes, UA Negative     Nitrite, UA Negative     Protein, UA Negative mg/dl      Glucose, UA Negative mg/dl      Ketones, UA 15 (1+) mg/dl      Urobilinogen, UA 0 2 E U /dl      Bilirubin, UA Negative     Blood, UA Large     Specific Gravity, UA >=1 030    Narrative:      CLINITEK RESULT                 No orders to display         Procedures  Procedures      ED Course                                       MDM  Number of Diagnoses or Management Options  Epilepsy [facility]):   Threatened :   Diagnosis management comments: Repeat hcg although limited utility  Discussed pelvic exam with bimanual but declined by patient  Will continue with plan for repeat hcg on Monday  Script written  Discussed with patient likelihood but miscarriage although unconfirmed  OB made aware  PCP follow-up, return precautions discussed  Patient appreciative and in agreement with plan        Disposition  Final diagnoses:   Threatened    Epilepsy ([facility] [address] )     Time reflects when diagnosis was documented in both MDM as applicable and the Disposition within this note     Time User Action Codes Description Comment    2021  8:03 PM Kaushik Trevor Add [O20 0] Threatened      2021  8:04 PM Kaushik Trevor Add [G40 909] Epilepsy [facility])       ED Disposition     ED Disposition Condition Date/Time Comment    Discharge Stable Sat 2021  6:42 PM Betsy Sadler discharge to home/self care  Follow-up Information     Follow up With Specialties Details Why Contact Info Additional [address] In 2 days  [address], [address] [address]  [address], [address], [facility], [place], [place], [address]    [address]  Call  To find  [address] [facility] In 2 days  [address] [id number]  [address], [address]          Discharge Medication List as of 2/20/2021  9:34 PM      CONTINUE these medications which have NOT CHANGED    Details   folic acid (FOLVITE) 156 MCG tablet Take 2 tablets by mouth daily , Historical Med      lamoTRIgine (LaMICtal) 100 mg tablet Take 1 tablet (100 mg total) by mouth 2 (two) times a day, Starting Wed 9/2/2020, Normal      ondansetron (ZOFRAN) 4 mg tablet Take 1 tablet (4 mg total) by mouth every 6 (six) hours, Starting Fri 2/19/2021, Normal      Prenatal w/o A Vit-Fe Fum-FA (PRENATA PO) Take by mouth, Historical Med           Outpatient Discharge Orders   hCG, quantitative   Standing Status: Future Standing Exp  Date: 02/22/22       PDMP Review     None           ED Provider  Attending physically available and evaluated Betsy Ramirez I managed the patient along with the ED Attending      Electronically Signed by         Emiliana Alonso MD  02/21/21 2875

## 2021-02-22 ENCOUNTER — LAB (OUTPATIENT)
Dept: LAB | Facility: HOSPITAL | Age: 26
End: 2021-02-22
Attending: EMERGENCY MEDICINE

## 2021-02-22 ENCOUNTER — TELEPHONE (OUTPATIENT)
Dept: OBGYN CLINIC | Facility: CLINIC | Age: 26
End: 2021-02-22

## 2021-02-22 DIAGNOSIS — O20.0 THREATENED ABORTION: ICD-10-CM

## 2021-02-22 LAB
B-HCG SERPL-ACNC: ABNORMAL MIU/ML
BACTERIA UR CULT: NORMAL

## 2021-02-22 PROCEDURE — 36415 COLL VENOUS BLD VENIPUNCTURE: CPT

## 2021-02-22 PROCEDURE — 84702 CHORIONIC GONADOTROPIN TEST: CPT

## 2021-02-23 ENCOUNTER — ULTRASOUND (OUTPATIENT)
Dept: OBGYN CLINIC | Facility: CLINIC | Age: 26
End: 2021-02-23

## 2021-02-23 VITALS
HEIGHT: 60 IN | BODY MASS INDEX: 22.97 KG/M2 | SYSTOLIC BLOOD PRESSURE: 116 MMHG | WEIGHT: 117 LBS | DIASTOLIC BLOOD PRESSURE: 58 MMHG | HEART RATE: 87 BPM

## 2021-02-23 DIAGNOSIS — N91.2 AMENORRHEA: Primary | ICD-10-CM

## 2021-02-23 PROCEDURE — 99213 OFFICE O/P EST LOW 20 MIN: CPT | Performed by: OBSTETRICS & GYNECOLOGY

## 2021-02-23 NOTE — PROGRESS NOTES
Subjective:     Satish Parra is a 30 y o  [facility] female who presents with vaginal spotting in the setting of pregnancy  Patient was recently seen in the ED on 2/19/20 for spotting  At that time her bHCG was 67008 and bedside U/S reveal gestational sac and yolk sac with no fetal pole  She has since stopped spotting and has no other complaints today and bHCG tow days later was 13207  Objective:    Vitals: Blood pressure 116/58, pulse 87, height 5' (1 524 m), weight 53 1 kg (117 lb), last menstrual period 01/14/2021  Body mass index is 22 85 kg/m²  FIRST [facility] OBSTETRIC ULTRASOUND  2/23/2021  Norma Baugh MD     INDICATION: Amenorrhea, viability    COMPARISON: None  TECHNIQUE:   Transvaginal imaging was performed to assess the gestation, myometrial/endometrial architecture and ovarian parenchymal detail  The study includes volumetric sweeps and traditional still imaging technique  FINDINGS:     A gestational sac and yolk sac were identified      YOLK SAC:  Present and normal in size and appearance  MEAN GESTATIONAL SAC LENGTH:  28mm  AMNIOTIC FLUID/SAC SHAPE:  Within expected normal range  UTERUS/ADNEXA:   No adnexal mass or pathologic cyst   No free fluid identified  IMPRESSION:    Early pregnancy or non viable missed AB    Assessment/Plan:    1  Early pregnancy vs non viable pregnancy   -Gestational sac and yolk sac seen w/o fetal pole   -This is a desired pregnancy, and while a fetus should be seen, the gestational sac and yolk sac appear normal so plan to proceed with repeat U/S in 10 days          Norma Baugh MD  2/23/2021  8:53 AM

## 2021-02-23 NOTE — PROGRESS NOTES
Note on use of High Level Disinfection Process (Trophon) for transvaginal probe:     Maximo Keenan    REF: [id number]   SN: [id number]     [address] [place] [address] Jessie #[id number]

## 2021-02-27 NOTE — ED ATTENDING ATTESTATION
2/20/2021  ILesly DO, saw and evaluated the patient  I have discussed the patient with the resident/non-physician practitioner and agree with the resident's/non-physician practitioner's findings, Plan of Care, and MDM as documented in the resident's/non-physician practitioner's note, except where noted  All available labs and Radiology studies were reviewed  I was present for key portions of any procedure(s) performed by the resident/non-physician practitioner and I was immediately available to provide assistance  At this point I agree with the current assessment done in the Emergency Department  I have conducted an independent evaluation of this patient a history and physical is as follows:    30 yof IUP confirmed yesterday around 6 weeks gestation, bleeding yetserday and now into today  No other symptons  Looks well  No syncope   Likely miscarriage, send beta hcg    ED Course         Critical Care Time  Procedures

## 2021-03-09 ENCOUNTER — TELEPHONE (OUTPATIENT)
Dept: NEUROLOGY | Facility: CLINIC | Age: 26
End: 2021-03-09

## 2021-03-09 DIAGNOSIS — G40.309 GENERALIZED CONVULSIVE EPILEPSY (HCC): Primary | ICD-10-CM

## 2021-03-09 NOTE — TELEPHONE ENCOUNTER
She needs blood work completed to check her lamotrigine level  She never had it check in the past year  We need to establish a baseline level to monitor dosing  Lamotrigine frequently has to be adjusted during pregnancy  Please get lamotrigine level check as soon as possible  Kurt Alvares

## 2021-03-09 NOTE — TELEPHONE ENCOUNTER
Patient calling to report that she is 2 months pregnant  Said that she was instructed to call you to come into the office for an appointment if she would get pregnant  Patient scheduled to see you 6/8  Please advise    [phone]: [phone] Justine Diaz to leave a detailed message      Lamotrigine 100 mg BID

## 2021-03-10 NOTE — TELEPHONE ENCOUNTER
Called and discussed with patient  She is asking if you would be able to give her a call personally  Said that she had mentioned to you about her wanting to get pregnant in the past and she now has a lot of questions regarding her medications and her pregnancy  Asking if she could hold off on getting her llabs until she speaks with you  I did advise that you are seeing patients throughout the day and it may be difficult for you reach out to her      Please advise    [phone]

## 2021-03-11 ENCOUNTER — OFFICE VISIT (OUTPATIENT)
Dept: NEUROLOGY | Facility: CLINIC | Age: 26
End: 2021-03-11
Payer: COMMERCIAL

## 2021-03-11 VITALS — BODY MASS INDEX: 22.58 KG/M2 | HEIGHT: 60 IN | WEIGHT: 115 LBS

## 2021-03-11 DIAGNOSIS — G40.909 EPILEPSY AFFECTING PREGNANCY, ANTEPARTUM (HCC): ICD-10-CM

## 2021-03-11 DIAGNOSIS — Z91.14 NON COMPLIANCE W MEDICATION REGIMEN: ICD-10-CM

## 2021-03-11 DIAGNOSIS — O99.350 EPILEPSY AFFECTING PREGNANCY, ANTEPARTUM (HCC): ICD-10-CM

## 2021-03-11 DIAGNOSIS — G40.309 NONINTRACTABLE GENERALIZED IDIOPATHIC EPILEPSY WITHOUT STATUS EPILEPTICUS (HCC): Primary | ICD-10-CM

## 2021-03-11 PROCEDURE — 99213 OFFICE O/P EST LOW 20 MIN: CPT | Performed by: PSYCHIATRY & NEUROLOGY

## 2021-03-11 NOTE — PROGRESS NOTES
Virtual Brief Visit    Assessment/Plan:    Problem List Items Addressed This Visit        Nervous and Auditory    Nonintractable generalized idiopathic epilepsy without status epilepticus ([facility] [address] ) - Primary    Epilepsy affecting pregnancy, antepartum ([facility] [address] )       Other    Non compliance w medication regimen                Reason for visit is   Chief Complaint   Patient presents with    Virtual Brief Visit     Generalized convulsive epilepsy        Encounter provider Marvin Pinto MD    Provider located at [address] [place] [id number] [phone]    Recent Visits  No visits were found meeting these conditions  Showing recent visits within past 7 days and meeting all other requirements     Future Appointments  No visits were found meeting these conditions  Showing future appointments within next 150 days and meeting all other requirements        After connecting through telephone, the patient was identified by name and date of birth  Prestonthao Emanuel was informed that this is a telemedicine visit and that the visit is being conducted through telephone  My office door was closed  No one else was in the room  She acknowledged consent and understanding of privacy and security of the platform  The patient has agreed to participate and understands she can discontinue the visit at any time  Patient is aware this is a billable service  [address] [facility] [address] [facility]  Follow Up Visit    Impression/Plan    Ms Dieter Hickman is a 30 y o  female with 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 the age of about 13 who, prior to 2/2015 she had only been on AED therapy for about 4 months over a period of 7 years  Seizures have occurred about every 1-2 years with the last event occurring 9/27/2018  All have likely occurred off AED therapy   Neurological exam has been normal  MRI brain 2008 and head CT 2012 are normal  2 EEGs have shown generalized bursts of spike wave discharges  Her most recent EEG in 2015 was normal (only on low dose lamotrigine)  She denies myoclonus or staring spells       She is pregnant with MAGGIE in 10/21/2021  I recommended at last visit that she remain on lamotrigine during pregnancy, but she decided to stop lamotrigine when she discovered she was pregnant  I believe the lowest risk situation overall would have been to continue lamotrigine, but her seizures are infrequent and therefore coming off AED therapy during pregnancy is relatively low risk  Her personal preference to eliminate any possible risk of fetal AED exposure  We discussed the risk/benefit of taking or not taking lamotrigine during pregnancy in detail today  Even if she agreed to go back on lamotrigine it would take an approximately 10 week titration to get to a therapeutic dose  We discussed that she can lower seizure risk by avoiding sleep deprivation as much as possible  She will benefit from additional help/support in caring for her  to help her avoid sleep deprivation as much as possible  She will also want to have people with her as often as possible during the day and minimize how often she holds her  while standing to reduce risk if a seizure were to occur  We will plan to start lamotrigine in the days after she give birth  We discussed that breast feeding is not contraindicated while taking lamotrigine  I will see her back this summer  At that visit we will also discuss the possibility of restarting AED therapy during the third trimester when the small theoretical risk of fetal lamotrigine exposure is even lower  Patient Instructions   1  It is important that you avoid sleep deprivation whenever possible while you are off lamotrigine  2  Let us know right away if there are seizures, staring spells or myoclonic jerks     3  Go to the ED or OB triage if you have a seizure in order to have a fetal nonstress test    4  Return in September 5  Contact our office when your baby is born so that we can re-start your seizure medication  Diagnoses and all orders for this visit:    Nonintractable generalized idiopathic epilepsy without status epilepticus ([facility] [address] )    Epilepsy affecting pregnancy, antepartum ([facility] [address] )    Non compliance w medication regimen        Subjective    Betsy Deras is returning to the [place] [address] [facility] for follow up  Interval Events:   Seizures since last visit: None (last seizure 9/27/2018)    She is pregnant, MAGGIE 10/21/2021  We discussed pregnancy and epilepsy at last visit  She was expressed desire to come of lamotrigine during pregnancy  We reviewed the AED pregnancy registry data and I recommended that she remain on lamotrigine during pregnancy  In February she discovered she is pregnant  She stopped lamotrigine 2/15/2021  She was not on seizure medication during her prior pregnancy  She is concerned about the risk of taking a seizure medication during pregnancy and does not want to go back on until after pregnancy  Current AEDs:  None (not taking lamotrigine since 2/15/2021)    History Reviewed: The following were reviewed and updated as appropriate: allergies, current medications, past medical history, past social history and problem list      Review of Systems   Constitutional: Negative  Negative for appetite change and fever  HENT: Negative  Negative for hearing loss, tinnitus, trouble swallowing and voice change  Eyes: Negative  Negative for photophobia and pain  Respiratory: Negative  Negative for shortness of breath  Cardiovascular: Negative  Negative for palpitations  Gastrointestinal: Negative  Negative for nausea and vomiting  Endocrine: Negative  Negative for cold intolerance  Genitourinary: Negative  Negative for dysuria, frequency and urgency  Musculoskeletal: Negative  Negative for myalgias and neck pain     Skin: Negative  Negative for rash  Neurological:  Negative for dizziness, tremors, syncope, facial asymmetry, speech difficulty, weakness, light-headedness, numbness and headaches  Hematological: Negative  Does not bruise/bleed easily  Psychiatric/Behavioral: Negative  Negative for confusion, hallucinations and sleep disturbance  ROS reviewed and updated as appropriate  Objective    Ht 5' (1 524 m)   Wt 52 2 kg (115 lb)   LMP 01/14/2021   BMI 22 46 kg/m²      General Exam  No acute distress  Neurologic Exam  Mental Status:  Alert and oriented x 3  Language: normal fluency and comprehension  I spent 22 minutes directly with the patient during this visit      VIRTUAL VISIT DISCLAIMER    Betsy Kendy Rucker acknowledges that she has consented to an online visit or consultation  She understands that the online visit is based solely on information provided by her, and that, in the absence of a face-to-face physical evaluation by the physician, the diagnosis she receives is both limited and provisional in terms of accuracy and completeness  This is not intended to replace a full medical face-to-face evaluation by the physician  Betsy Rucker understands and accepts these terms

## 2021-03-11 NOTE — PROGRESS NOTES
Virtual Brief Visit    Assessment/Plan:    Problem List Items Addressed This Visit     None                Reason for visit is   Chief Complaint   Patient presents with    Virtual Brief Visit        Encounter provider Ruben Hoff MD    Provider located at [address] [place] [id number] [phone]    Recent Visits  No visits were found meeting these conditions  Showing recent visits within past 7 days and meeting all other requirements     Today's Visits  Date Type Provider Dept   03/11/21 Office Visit Ruben Hoff MD [facility]   Showing today's visits and meeting all other requirements     Future Appointments  No visits were found meeting these conditions  Showing future appointments within next 150 days and meeting all other requirements        After connecting through {AMB VIRTUAL VISIT HNQUUF:40457}, the patient was identified by name and date of birth  Jose F Lisa was informed that this is a telemedicine visit and that the visit is being conducted through {AMB CORONAVIRUS VISIT INVVFA:04065}  {Telemedicine confidentiality :68039} {Telemedicine participants:68031}  She acknowledged consent and understanding of privacy and security of the platform  The patient has agreed to participate and understands she can discontinue the visit at any time  Patient is aware this is a billable service  Subjective    Betsy Reynoso is a 30 y o  female ***    HPI     Past Medical History:   Diagnosis Date    Allergic to peanuts     Anxiety     Asthma     Depression     Seizures (HCC)        Past Surgical History:   Procedure Laterality Date    INSERTION OF INTRAUTERINE DEVICE (IUD)         Current Outpatient Medications   Medication Sig Dispense Refill    folic acid (FOLVITE) 832 MCG tablet Take 2 tablets by mouth daily       lamoTRIgine (LaMICtal) 100 mg tablet Take 1 tablet (100 mg total) by mouth 2 (two) times a day 180 tablet 3    ondansetron (ZOFRAN) 4 mg tablet Take 1 tablet (4 mg total) by mouth every 6 (six) hours 12 tablet 0    Prenatal w/o A Vit-Fe Fum-FA (PRENATA PO) Take by mouth       No current facility-administered medications for this visit  Allergies   Allergen Reactions    Other Anaphylaxis     PT "I am allergic to all nuts"        Review of Systems    There were no vitals filed for this visit  {covid time spent:73178}    VIRTUAL VISIT DISCLAIMER    Betsy Mixonrissa Ashley acknowledges that she has consented to an online visit or consultation  She understands that the online visit is based solely on information provided by her, and that, in the absence of a face-to-face physical evaluation by the physician, the diagnosis she receives is both limited and provisional in terms of accuracy and completeness  This is not intended to replace a full medical face-to-face evaluation by the physician  Betsy Ramirez understands and accepts these terms

## 2021-03-22 PROBLEM — G40.909 EPILEPSY AFFECTING PREGNANCY, ANTEPARTUM (HCC): Status: ACTIVE | Noted: 2021-03-22

## 2021-03-22 PROBLEM — O99.350 EPILEPSY AFFECTING PREGNANCY, ANTEPARTUM (HCC): Status: ACTIVE | Noted: 2021-03-22

## 2021-03-22 NOTE — PATIENT INSTRUCTIONS
1  It is important that you avoid sleep deprivation whenever possible while you are off lamotrigine  2  Let us know right away if there are seizures, staring spells or myoclonic jerks  3  Go to the ED or OB triage if you have a seizure in order to have a fetal nonstress test    4  Return in September 5  Contact our office when your baby is born so that we can re-start your seizure medication

## 2021-04-06 ENCOUNTER — TELEPHONE (OUTPATIENT)
Dept: NEUROLOGY | Facility: CLINIC | Age: 26
End: 2021-04-06

## 2021-04-06 NOTE — TELEPHONE ENCOUNTER
Letter forwarded to patient indicating that she should avoid photic stimulation, including barcode readers at work, to reduce risk of photosensitive seizures  She started using a barcode scanner at work two days ago and was using one just prior to her recent seizure  She does not have a prior history of photosensitive seizures, but she did notice the flashing red light from the barcode reader was making her feel uncomfortable

## 2021-04-06 NOTE — TELEPHONE ENCOUNTER
Patient calling to report a seizure  Said that she is currently in the ED at [facility]  States that a nurse witnessed the seizure at her work and said that it lasted less than 5 minutes  Said that she stopped taking lamotrigine when she found out that she was pregnant  Asking if she can go back on the medication  Requested records from [facility]  Please advise  Asking if you could give her a call personally but understands that you are busy and is ok with a nurse giving message      [phone]: Dulce Maria Human to leave a detailed message

## 2021-04-06 NOTE — TELEPHONE ENCOUNTER
Call received from [facility]  Calling to inform patient is 12 weeks pregnant  Doing well  No issues with baby  He will be restarting lamotrigine and patient will be discharged today pending no concerns from OB standpoint  If you would like to discuss with him, he can be reached at [phone]

## 2021-04-06 NOTE — TELEPHONE ENCOUNTER
Epileptologist On Call Note  Spoke with patient  I recommend she restart lamotrigine which she is now willing to do  The dose can be titrated weekly  A target dose of 150 mg bid likely makes sense  Blood lamotrigine levels will be lower due to pregnancy  Would then check level  [facility] neurology can call me to discuss her case  I am available during office hours and I am also on call all week and available through the hospital  after hours (ask for epileptologist on call)  I asked Betsy to give our office an update later this week or early next week  She is currently scheduled to see me in June

## 2021-04-06 NOTE — TELEPHONE ENCOUNTER
Noted  I agree with that plan  She can touch base with us later this week or next week to give us an update on how she is doing and the specific plan for lamotrigine  If everything is going fine we can keep follow up as scheduled in June

## 2021-04-06 NOTE — LETTER
April 6, 2021     Patient: Saeid Modi   YOB: 1995           To Whom it May Concern:    Avila Sarabia is under my professional care  She should avoid performing tasks at work that involve exposure to high frequency flashing light, including barcode readers, that have the potential to induce photosensitive seizures  If you have any questions or concerns, please don't hesitate to call           Sincerely,             Antoine Daniels MD   [facility]  [facility] [address] [facility]          CC: No Recipients

## 2021-04-27 ENCOUNTER — TELEPHONE (OUTPATIENT)
Dept: OTHER | Facility: OTHER | Age: 26
End: 2021-04-27

## 2021-04-27 DIAGNOSIS — G40.309 GENERALIZED CONVULSIVE EPILEPSY (HCC): ICD-10-CM

## 2021-04-28 RX ORDER — LAMOTRIGINE 150 MG/1
TABLET ORAL
Qty: 60 TABLET | Refills: 5 | Status: SHIPPED | OUTPATIENT
Start: 2021-04-28 | End: 2021-05-27 | Stop reason: SDUPTHER

## 2021-04-28 NOTE — TELEPHONE ENCOUNTER
Spoke to patient  She has been on 100mg BID dosing of lamotrigine  Originally had no intentions of increasing but now agreeable due to seizure  Please send new script to [facility]

## 2021-04-28 NOTE — TELEPHONE ENCOUNTER
How long has she been on lamotrigine 100 mg bid and is there a planned further titration? If she has been on 100 mg bid for at least one week then she should increase to 150 mg twice daily now

## 2021-04-28 NOTE — TELEPHONE ENCOUNTER
[phone] Piero Trent from [facility] ED / pt Ale Baker [birth year] / re: Dr Hermelinda Venegas @ [facility] ED is requesting to speak to a provider in regard to this pt that is currently at the ED, she is pregnant- had a seizure that lasted 2-3minutes   Pt has a history of epilepsy

## 2021-04-28 NOTE — TELEPHONE ENCOUNTER
Neurology on call      29 y/o female with a history of generalized epilepsy , recently started on lamictal 100mg bid after having a seizure on 04/06/21  Presented to [facility] er this afternoon 04/27/21  after a witnessed typical seizure   16 weeks pregnant   Back to baseline   Pot 3 4   Er will be replacing potassium and will d/c the pt to home   A lamictal level was sent and pending    Pt is not driving     I informed Dr Lashell Alberto at [address] [facility] about Lamictal and pregnancy     Will let dr Ramesh Park and the Nurse team know

## 2021-05-14 ENCOUNTER — TELEPHONE (OUTPATIENT)
Dept: NEUROLOGY | Facility: CLINIC | Age: 26
End: 2021-05-14

## 2021-05-24 ENCOUNTER — APPOINTMENT (OUTPATIENT)
Dept: LAB | Facility: HOSPITAL | Age: 26
End: 2021-05-24
Attending: PSYCHIATRY & NEUROLOGY
Payer: COMMERCIAL

## 2021-05-24 DIAGNOSIS — G40.309 GENERALIZED CONVULSIVE EPILEPSY (HCC): ICD-10-CM

## 2021-05-24 PROCEDURE — 36415 COLL VENOUS BLD VENIPUNCTURE: CPT

## 2021-05-24 PROCEDURE — 80175 DRUG SCREEN QUAN LAMOTRIGINE: CPT

## 2021-05-26 LAB — LAMOTRIGINE SERPL-MCNC: 4.9 UG/ML (ref 2–20)

## 2021-05-27 ENCOUNTER — TELEPHONE (OUTPATIENT)
Dept: NEUROLOGY | Facility: CLINIC | Age: 26
End: 2021-05-27

## 2021-05-27 DIAGNOSIS — G40.309 GENERALIZED CONVULSIVE EPILEPSY (HCC): ICD-10-CM

## 2021-05-27 RX ORDER — LAMOTRIGINE 200 MG/1
200 TABLET ORAL 2 TIMES DAILY
Qty: 60 TABLET | Refills: 5 | Status: SHIPPED | OUTPATIENT
Start: 2021-05-27 | End: 2021-08-23 | Stop reason: SDUPTHER

## 2021-05-27 NOTE — TELEPHONE ENCOUNTER
[id number] Justine Diaz      "she is able to use the "grey" scanner which does not have a discernable strobe effect that could increase seizure risk "

## 2021-05-27 NOTE — TELEPHONE ENCOUNTER
She is pregnant, currently about 18 weeks gestation  There have been generalized tonic clonic seizures during this pregnancy  Lamotrigine level is on the low side  I recommend increasing lamotrigine to 150 mg AM / 200 mg PM x 1 week and then increasing to 200 mg twice daily  New Rx sent  Another lamotrigine level should be collected about 4 weeks from the last level  I would like to target a lamotrigine level of 7  Notify us of any seizures or problems with side effects

## 2021-05-27 NOTE — TELEPHONE ENCOUNTER
Called and discussed message with patient and she is agreeable to plan  Asking if you would be agreeable to providing another letter to her employer? Would like the note to discuss her recent seizures and being allowed to use the "grey" scanner at work  Said that this scanner does not have a blinking light like the "Thor" scanner that she is unable to use  Please advise  Would like letter sent via email if agreeable  Nenita Gastelum is under my professional care  Her recent seizures were caused by her pregnancy and not being on medication  Now that she is back on medication her seizures are controlled  She is able to use the "grey" scanner at work because there is not a blinking light that could trigger a possible seizure

## 2021-05-27 NOTE — LETTER
May 27, 2021     Patient: Alice Franco   YOB: 1995           To Whom it May Concern:    Nicholas Beltrán is under my professional care  Her recent seizures were caused by her pregnancy and not being on medication  Now that she is back on medication her seizures are controlled  She is able to use the "grey" scanner which does not have a discernable strobe effect that could increase seizure risk  If you have any questions or concerns, please don't hesitate to call           Sincerely,          Davon Thapa MD        CC: No Recipients

## 2021-06-08 ENCOUNTER — OFFICE VISIT (OUTPATIENT)
Dept: NEUROLOGY | Facility: CLINIC | Age: 26
End: 2021-06-08
Payer: COMMERCIAL

## 2021-06-08 VITALS
HEIGHT: 60 IN | BODY MASS INDEX: 23.95 KG/M2 | HEART RATE: 94 BPM | DIASTOLIC BLOOD PRESSURE: 60 MMHG | WEIGHT: 122 LBS | SYSTOLIC BLOOD PRESSURE: 110 MMHG

## 2021-06-08 DIAGNOSIS — O99.350 EPILEPSY AFFECTING PREGNANCY, ANTEPARTUM (HCC): ICD-10-CM

## 2021-06-08 DIAGNOSIS — G40.309 NONINTRACTABLE GENERALIZED IDIOPATHIC EPILEPSY WITHOUT STATUS EPILEPTICUS (HCC): Primary | ICD-10-CM

## 2021-06-08 DIAGNOSIS — G40.909 EPILEPSY AFFECTING PREGNANCY, ANTEPARTUM (HCC): ICD-10-CM

## 2021-06-08 PROCEDURE — 99214 OFFICE O/P EST MOD 30 MIN: CPT | Performed by: PSYCHIATRY & NEUROLOGY

## 2021-06-08 NOTE — PROGRESS NOTES
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 since the age of about 13 who, prior to 2/2015 she had only been on AED therapy for about 4 months over a period of 7 years  Seizures had occurred about every 1-2 years with the last event occurring 9/27/2018  All likely occurred off AED therapy  Neurological exam has been normal  MRI brain 2008 and head CT 2012 are normal  2 EEGs have shown generalized bursts of spike wave discharges  Her most recent EEG in 8/2015 was normal (only on low dose lamotrigine)  She denies myoclonus or staring spells  She is currently pregnant and there have no been 3 seizures during pregnancy, 2 occurring off lamotrigine and the most recent while taking lamotrigine inconsistently  Lamotrigine was recently increased in response to a serum level on the lower side  Will recheck lamotrigine in a few days and target a level of 7  Lamotrigine levels will be checked every 4 weeks during the remainder of pregnancy  She may choose to remain on a lamotrigine dose higher than her prior 100 mg bid after pregnancy  She feels her mood is better on the higher dose  Patient Instructions   1  Continue lamotrigine 200 mg twice daily  2  Have lamotrigine level drawn later this week or early next week  Have level drawn after in the afternoon after you get our of work  3  Touch base after level comes back  4  Have lamotrigine level drawn monthly during pregnancy  5  Follow up as scheduled in September Diagnoses and all orders for this visit:    Nonintractable generalized idiopathic epilepsy without status epilepticus ([address] )    Epilepsy affecting pregnancy, antepartum ([address] )        Subjective    Betsy Mcmahan is returning to the [place] [address] [facility] for follow up       Interval Events:   MAGGIE is 10/21/2021 per 5/13/2021 JAREN note     Seizures had been well controlled  She stopped lamotrigine when she found out she was pregnant  There have now been three seizures during this pregnancy  2 before getting back on lamotrigine  The most recent was 5/6/2021 and lamotrigine has been increased since  She had been missing some doses at that time  Current AEDs:  Lamotrigine 200 mg bid  Medication side effects: None  Medication adherence: Yes    Event/Seizure semiology:  Infrequent generalized tonic-clonic seizures     Special Features  Status epilepticus: no  Self Injury Seizures: yes - tongue injury  Precipitating Factors: Medication non adherence     Epilepsy Risk Factors:  None      Prior AEDs:  Keppra worsened seizures and caused mood swing (only used for a short time)      Prior Evaluation:  MRI brain w/ and w/o 8/18/2008: normal      Head CT 1/1/2012: normal     Routine EEG done August 25, 2015: Normal      EEG done 8/1/2011 (Dr Emiliana Nielsen): This is an abnormal EEG due to generalized fast spike-slow wave activity accentuated by HV  Thses findings would be consistent with a primary generalized epilepsy, particularly juvenile absence epilepsy in this age group  Further clinical correlation is advised       EEG done 8/19/08 (Dr Emiliana Nielsen): This is a borderline EEG with the childâs age of 12 years due to a solitary burst of generalized, fast spike-slow wave activity lasting less thatn 1 second, actvivated by HV  This may correlate with epilepsy of a primary generalized type, and further clinical correlation is advised  In addition, either ambulatory monitoring, or a repeat EEG greater than 1 week after her ictal event may be of additional diagnostic benefit          History Reviewed:    The following were reviewed and updated as appropriate: allergies, current medications, past medical history, past social history and problem list     Psychiatric History:  Anxiety, panic attacks     Social History:   Driving: yes  Lives Alone:  Lives with family and boyfriend  Occupation:    Daughter is about 9 yo and does not have seizures  Objective    /60 (BP Location: Left arm, Patient Position: Sitting, Cuff Size: Standard)   Pulse 94   Ht 5' (1 524 m)   Wt 55 3 kg (122 lb)   LMP 01/14/2021   BMI 23 83 kg/m²      General Exam  No acute distress  Neurologic Exam  Mental Status:  Alert and oriented  Language: normal fluency and comprehension  Cranial Nerves:   No dysarthria  Gait: Normal casual gait  ROS:    Review of Systems   Constitutional: Negative  Negative for appetite change and fever  HENT: Negative  Negative for hearing loss, tinnitus, trouble swallowing and voice change  Eyes: Negative  Negative for photophobia and pain  Respiratory: Negative  Negative for shortness of breath  Cardiovascular: Negative  Negative for palpitations  Gastrointestinal: Negative  Negative for nausea and vomiting  Endocrine: Negative  Negative for cold intolerance  Genitourinary: Negative  Negative for dysuria, frequency and urgency  Musculoskeletal: Negative  Negative for myalgias and neck pain  Skin: Negative  Negative for rash  Neurological: Positive for seizures  Negative for dizziness, tremors, syncope, facial asymmetry, speech difficulty, weakness, light-headedness, numbness and headaches  Hematological: Negative  Does not bruise/bleed easily  Psychiatric/Behavioral: Negative  Negative for confusion, hallucinations and sleep disturbance  ROS reviewed and updated as appropriate

## 2021-06-08 NOTE — PATIENT INSTRUCTIONS
1  Continue lamotrigine 200 mg twice daily  2  Have lamotrigine level drawn later this week or early next week  Have level drawn after in the afternoon after you get our of work  3  Touch base after level comes back  4  Have lamotrigine level drawn monthly during pregnancy  5  Follow up as scheduled in September

## 2021-06-11 ENCOUNTER — TELEPHONE (OUTPATIENT)
Dept: NEUROLOGY | Facility: CLINIC | Age: 26
End: 2021-06-11

## 2021-06-11 DIAGNOSIS — G40.309 NONINTRACTABLE GENERALIZED IDIOPATHIC EPILEPSY WITHOUT STATUS EPILEPTICUS (HCC): Primary | ICD-10-CM

## 2021-06-11 RX ORDER — LAMOTRIGINE 100 MG/1
TABLET ORAL
Qty: 60 TABLET | Refills: 5 | Status: SHIPPED | OUTPATIENT
Start: 2021-06-11 | End: 2021-08-23 | Stop reason: SDUPTHER

## 2021-06-11 NOTE — LETTER
June 14, 2021     Patient: Usha Renee   YOB: 1995           To Whom it May Concern:    Emily Campbell is under my professional care  She is able to return to work starting 6/14/21  She does not have any further restrictions except that she should avoid performing tasks at work that involve exposure to high frequency flashing light, including barcode readers, that have the potential to induce photosensitive seizures  She is able to continue using the grey scanner  If you have any questions or concerns, please don't hesitate to call           Sincerely,          Samantha Quispe MD

## 2021-06-11 NOTE — TELEPHONE ENCOUNTER
Increase lamotrigine to 250 mg twice daily now and then increase to 300 mg twice daily in one week as long as lamotrigine increase is tolerated  Recheck lamotrigine level one week after reaching new target dose  New Rx for 100 mg pills sent to [facility]

## 2021-06-11 NOTE — TELEPHONE ENCOUNTER
Patient calling to report a seizure that she had this morning while at work  She was taken the ED at [facility]  Her employer is requesting a letter for her to return to work  Was advised at the ED that her medication should be increased and should follow up with you within a week  Please advise  Would like letter sent to her email on file and her employers at [email or web address]    [phone]    Seizure description: grand mal seizure  Witnessed? yes  Duration? 90 seconds  Multiple Seizures? no     Brought to the ER? Yes   [facility]  Usual type of seizure? Yes  Sleep deprivation? Yes  Missed Medications? No     Recently/Currently ill (URI, UTI, infections etc ) No    Any new medicatons (including OTC) No    ETOH or Drug use? No    New Stressors?  No  Current Medications confirmed as:    Lamotrigine 200 mg BID

## 2021-06-14 ENCOUNTER — APPOINTMENT (OUTPATIENT)
Dept: URGENT CARE | Age: 26
End: 2021-06-14
Payer: OTHER MISCELLANEOUS

## 2021-06-14 PROCEDURE — G0382 LEV 3 HOSP TYPE B ED VISIT: HCPCS | Performed by: PHYSICIAN ASSISTANT

## 2021-06-14 PROCEDURE — 99283 EMERGENCY DEPT VISIT LOW MDM: CPT | Performed by: PHYSICIAN ASSISTANT

## 2021-06-14 NOTE — TELEPHONE ENCOUNTER
Called and discussed medication changes with patient  She is agreeable to plan  Confirms that the seizure was not triggered by the scanners  Letter generated and emailed

## 2021-06-18 ENCOUNTER — APPOINTMENT (OUTPATIENT)
Dept: URGENT CARE | Age: 26
End: 2021-06-18
Payer: OTHER MISCELLANEOUS

## 2021-06-18 PROCEDURE — 99213 OFFICE O/P EST LOW 20 MIN: CPT | Performed by: PHYSICIAN ASSISTANT

## 2021-07-08 ENCOUNTER — TELEPHONE (OUTPATIENT)
Dept: NEUROLOGY | Facility: CLINIC | Age: 26
End: 2021-07-08

## 2021-07-08 DIAGNOSIS — G40.309 GENERALIZED CONVULSIVE EPILEPSY (HCC): ICD-10-CM

## 2021-07-08 NOTE — TELEPHONE ENCOUNTER
Patient calling to request 300mg tablet of lamotrigine rather than taking 200mg +100mg or cutting 200mg tablets in half  Informed her lamotrigine does not come in 300mg tablet  She should be taking a 200mg tablet plus a 100mg tablet  Call placed to [facility] pharmacy  Patient picked up the following medications  Lamotrigine 100mg tablet -  6/1/2021 #180 tablets  Lamotrigine 200mg tablet - 6/27/2021 #60    Goodrx 100mg - 60 tablets $11    200mg $13       Called patient back and informed her of above options  She stated she no longer has 100mg tablets as she got rid of them when her dose changed  She misunderstood directions when she was told to take 1-100mg tablet and 1-200mg tablet  Patient will reach out to insurance company to see if override can be done  Or if not, she will use GoodRx option and call us if anything is needed

## 2021-07-15 NOTE — TELEPHONE ENCOUNTER
Patient calling to say that the insurance needs to put in an override so she is able to get her medication  Patient is able to  her 200 mg but they are unable to put in an override for the 100 mg tabs  Called patient and made her aware  Patient will use goodrx to  100 mg tabs

## 2021-08-23 ENCOUNTER — TELEPHONE (OUTPATIENT)
Dept: NEUROLOGY | Facility: CLINIC | Age: 26
End: 2021-08-23

## 2021-08-23 DIAGNOSIS — G40.309 NONINTRACTABLE GENERALIZED IDIOPATHIC EPILEPSY WITHOUT STATUS EPILEPTICUS (HCC): ICD-10-CM

## 2021-08-23 DIAGNOSIS — G40.309 GENERALIZED CONVULSIVE EPILEPSY (HCC): ICD-10-CM

## 2021-08-23 DIAGNOSIS — O99.350 EPILEPSY AFFECTING PREGNANCY, ANTEPARTUM (HCC): Primary | ICD-10-CM

## 2021-08-23 DIAGNOSIS — G40.909 EPILEPSY AFFECTING PREGNANCY, ANTEPARTUM (HCC): Primary | ICD-10-CM

## 2021-08-23 RX ORDER — LAMOTRIGINE 200 MG/1
TABLET ORAL
Qty: 60 TABLET | Refills: 5 | Status: SHIPPED | OUTPATIENT
Start: 2021-08-23 | End: 2021-09-20 | Stop reason: SDUPTHER

## 2021-08-23 RX ORDER — LAMOTRIGINE 100 MG/1
TABLET ORAL
Qty: 21 TABLET | Refills: 0 | Status: SHIPPED | OUTPATIENT
Start: 2021-08-23 | End: 2021-09-15 | Stop reason: SDUPTHER

## 2021-08-23 NOTE — TELEPHONE ENCOUNTER
The seizure was likely caused by low lamotrigine level  She should increase lamotrigine to 350 mg twice daily now for one week and then increase to 400 mg twice daily  Recheck lamotrigine level after taking lamotrigine 400 mg twice daily for 3-5 days  While I know the dose seems high, it is not atypical to have to increase lamotrigine significantly during pregnancy and the low serum level means that her exposure to lamotrigine and her baby's exposure are similar to that of someone taking a low/moderate dose of of lamotrigine  I would like to talk to her if she is hesitant to increase the dose

## 2021-08-23 NOTE — TELEPHONE ENCOUNTER
patient calling to report seizure august 11  patient reports typical grandmal seizure  lasted approx 2 minutes  hit head but no injuries  denies missed doses of medications  Was seen in ed for eval due to being pregant       lamictal level 2 7    Medications confirmed:  Lamotrigine 300mg BID

## 2021-08-24 NOTE — TELEPHONE ENCOUNTER
Spoke to patient  Reviewed recommendations with her  Verbalized understanding with them  Also sent [employer] message to patient with recommendations listed

## 2021-09-02 ENCOUNTER — TELEPHONE (OUTPATIENT)
Dept: NEUROLOGY | Facility: CLINIC | Age: 26
End: 2021-09-02

## 2021-09-13 ENCOUNTER — TELEPHONE (OUTPATIENT)
Dept: NEUROLOGY | Facility: CLINIC | Age: 26
End: 2021-09-13

## 2021-09-13 NOTE — TELEPHONE ENCOUNTER
Patient calling to request lab script be faxed to [facility] for labs to be drawn tomorrow  Callplaced to [facility] ([phone]), fax obtained - [phone]  Printed labs and faxed to them

## 2021-09-14 NOTE — TELEPHONE ENCOUNTER
Patient asking for lab order for lamotrigine to be faxed again to [facility]  Said that she went to get blood work this morning but they did not have the order  Faxed order

## 2021-09-15 ENCOUNTER — OFFICE VISIT (OUTPATIENT)
Dept: NEUROLOGY | Facility: CLINIC | Age: 26
End: 2021-09-15
Payer: COMMERCIAL

## 2021-09-15 VITALS
SYSTOLIC BLOOD PRESSURE: 94 MMHG | BODY MASS INDEX: 27.54 KG/M2 | DIASTOLIC BLOOD PRESSURE: 56 MMHG | HEART RATE: 78 BPM | WEIGHT: 141 LBS | TEMPERATURE: 97.6 F

## 2021-09-15 DIAGNOSIS — G40.909 EPILEPSY AFFECTING PREGNANCY, ANTEPARTUM (HCC): Primary | ICD-10-CM

## 2021-09-15 DIAGNOSIS — O99.350 EPILEPSY AFFECTING PREGNANCY, ANTEPARTUM (HCC): Primary | ICD-10-CM

## 2021-09-15 DIAGNOSIS — G40.309 NONINTRACTABLE GENERALIZED IDIOPATHIC EPILEPSY WITHOUT STATUS EPILEPTICUS (HCC): ICD-10-CM

## 2021-09-15 PROCEDURE — 99214 OFFICE O/P EST MOD 30 MIN: CPT | Performed by: PSYCHIATRY & NEUROLOGY

## 2021-09-15 RX ORDER — DIAPER,BRIEF,INFANT-TODD,DISP
EACH MISCELLANEOUS 2 TIMES DAILY
COMMUNITY
Start: 2021-07-12 | End: 2022-07-12

## 2021-09-15 NOTE — PROGRESS NOTES
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 since the age of about 13 who, prior to 2/2015 she had only been on AED therapy for about 4 months over a period of 7 years  Seizures had occurred about every 1-2 years with the last event occurring 9/27/2018  All likely occurred off AED therapy  Neurological exam has been normal  MRI brain 2008 and head CT 2012 are normal  2 EEGs have shown generalized bursts of spike wave discharges  Her most recent EEG in 8/2015 was normal (only on low dose lamotrigine)  She denies myoclonus or staring spells  She is 35 weeks pregnant  Seizures have worsened during pregnancy  Most recent seizure occurred on August 11th with subsequent increase in lamotrigine dose  She just had a follow-up lamotrigine level drawn yesterday, but the results are not back yet  I am targeting a lamotrigine level of 7  We discussed today the importance of having levels drawn 1 week after dose changes were made during pregnancy  We discussed today that she will require a decreased dose of lamotrigine shortly after she gives birth to avoid emergence of side effects as lamotrigine levels increase quickly after pregnancy  If she remains on 400 mg twice daily during pregnancy the dose can be decreased to 300 mg twice daily the day after she gives birth  A lamotrigine level can then be checked in 1 week  The lamotrigine dose can be decreased further if there are side effects that emerged after pregnancy  Will plan to keep her on at least 200 mg twice daily after pregnancy  We discussed caring for her infant in the setting of epilepsy and recent poor seizure control  Discussed minimizing the time that she carries the baby while standing  Discussed feeding and changing the baby on the floor    Significant other will be taking leave and home with her to assist with care  Discussed that there is no driving until she is seizure-free for 6 months  Patient Instructions   1  Call us on Friday if you haven't heard about your lamotrigine level  Will target a lamotrigine level of 7 or higher  2  I recommend decreasing lamotrigine shortly after giving birth  Will likely decrease to 300 mg twice daily  3  Have a lamotrigine level checked 1-2 weeks after giving birth  4  Return in about 4 months  Diagnoses and all orders for this visit:    Epilepsy affecting pregnancy, antepartum ([facility] )  -     Lamotrigine level; Future    Nonintractable generalized idiopathic epilepsy without status epilepticus ([facility] )    Other orders  -     hydrocortisone 1 % cream; Apply topically 2 (two) times a day        Subjective    Betsy Altman is returning to the [place] [address] [facility] for follow up  Interval Events:   Seizures since last visit: yes  Hospitalizations: yes - ED visit    She is 35 weeks pregnant  There was a generalized tonic-clonic seizure on 8/11/2021 when she was 30 weeks pregnant  She was on lamotrigine 300 mg bid at that time  Lamotrigine level at that time was 2 7 (collected at 19:40)  Her dose of lamotrigine was subsequently increased to 400 mg bid  Had lamotrigine level done yesterday  Result not back yet  This was the first level drawn since her dose increase after the 8/11 seizure       Current AEDs:  Lamotrigine 400 mg twice daily  Medication side effects: None  Medication adherence: Yes    Event/Seizure semiology:  Generalized tonic-clonic seizures     Special Features  Status epilepticus: no  Self Injury Seizures: yes - tongue injury  Precipitating Factors: Medication non adherence     Epilepsy Risk Factors:  None      Prior AEDs:  Keppra worsened seizures and caused mood swing (only used for a short time)      Prior Evaluation:  MRI brain w/ and w/o 8/18/2008: normal      Head CT 1/1/2012: normal     Routine EEG done August 25, 2015: Normal      EEG done 8/1/2011 (Dr Mary Hutson): This is an abnormal EEG due to generalized fast spike-slow wave activity accentuated by HV  Thses findings would be consistent with a primary generalized epilepsy, particularly juvenile absence epilepsy in this age group  Further clinical correlation is advised       EEG done 8/19/08 (Dr Mary Hutson): This is a borderline EEG with the childâs age of 12 years due to a solitary burst of generalized, fast spike-slow wave activity lasting less thatn 1 second, actvivated by HV  This may correlate with epilepsy of a primary generalized type, and further clinical correlation is advised  In addition, either ambulatory monitoring, or a repeat EEG greater than 1 week after her ictal event may be of additional diagnostic benefit          History Reviewed: The following were reviewed and updated as appropriate: allergies, current medications, past medical history, past social history and problem list     Psychiatric History:  Anxiety, panic attacks     Social History:   Driving: no, stopped after recent seizures  Lives Alone:  no  Occupation:    Daughter is about 9 yo and does not have seizures  Objective    BP 94/56 (BP Location: Left arm, Patient Position: Sitting, Cuff Size: Standard)   Pulse 78   Temp 97 6 °F (36 4 °C) (Temporal)   Wt 64 kg (141 lb)   LMP 01/14/2021   BMI 27 54 kg/m²      General Exam  No acute distress  Neurologic Exam  Mental Status:  Alert and oriented x 3  Language: normal fluency and comprehension  Cranial Nerves: no dysarthria  Gait: Normal casual gait  ROS:    Review of Systems   Constitutional: Negative  Negative for appetite change and fever  HENT: Negative  Negative for hearing loss, tinnitus, trouble swallowing and voice change  Eyes: Negative  Negative for photophobia and pain  Respiratory: Negative  Negative for shortness of breath  Cardiovascular: Negative  Negative for palpitations  Gastrointestinal: Negative  Negative for nausea and vomiting  Endocrine: Negative  Negative for cold intolerance  Genitourinary: Negative  Negative for dysuria, frequency and urgency  Musculoskeletal: Negative  Negative for myalgias and neck pain  Skin: Negative  Negative for rash  Neurological: Negative  Negative for dizziness, tremors, seizures, syncope, facial asymmetry, speech difficulty, weakness, light-headedness, numbness and headaches  Hematological: Negative  Does not bruise/bleed easily  Psychiatric/Behavioral: Negative  Negative for confusion, hallucinations and sleep disturbance  ROS reviewed and updated as appropriate

## 2021-09-15 NOTE — PATIENT INSTRUCTIONS
1  Call us on Friday if you haven't heard about your lamotrigine level  Will target a lamotrigine level of 7 or higher  2  I recommend decreasing lamotrigine shortly after giving birth  Will likely decrease to 300 mg twice daily  3  Have a lamotrigine level checked 1-2 weeks after giving birth  4  Return in about 4 months

## 2021-09-20 ENCOUNTER — TELEPHONE (OUTPATIENT)
Dept: NEUROLOGY | Facility: CLINIC | Age: 26
End: 2021-09-20

## 2021-09-20 DIAGNOSIS — G40.309 GENERALIZED CONVULSIVE EPILEPSY (HCC): ICD-10-CM

## 2021-09-20 RX ORDER — LAMOTRIGINE 200 MG/1
500 TABLET ORAL 2 TIMES DAILY
Qty: 150 TABLET | Refills: 2 | Status: SHIPPED | OUTPATIENT
Start: 2021-09-20 | End: 2022-01-10 | Stop reason: SDUPTHER

## 2021-09-20 NOTE — TELEPHONE ENCOUNTER
----- Message from Domingo Ovalle sent at 9/20/2021 10:04 AM EDT -----  Regarding: RE: Test Results Question  Contact: [phone]  A screen shot was the only way I could send it let me know if you're able to see it     ----- Message -----  From: Nathalie Phan RN  Sent: 9/20/21, 8:08 AM  To: Fermin Bryant  Subject: RE: Test Results Question    Rodrigo Meyer! We haven't received the results yet from the lab  Is there a way you can have them sent to us?        ----- Message -----       From:Betsy Villarreal       Sent:9/18/2021 10:00 PM EDT         To:Shashi Arauz MD    Subject:Test Results Question    Hi Dr Radha Reina     I got my result for my levels back from the lab it came back with my levels being at 5  Is that okay? Or should I raise the dosage again?

## 2021-09-20 NOTE — TELEPHONE ENCOUNTER
Dr Linder - please advise  Per screen shot (image in [employer] message) patients lamotrigine level was 5  Any new recommendations? Spoke to patient, currently taking lamotrigine 400mg BID

## 2021-09-20 NOTE — TELEPHONE ENCOUNTER
Please increase lamotrigine to 500 mg twice daily  Recheck lamotrigine after taking higher dose for one week  Targeting level of 7

## 2021-10-04 ENCOUNTER — APPOINTMENT (OUTPATIENT)
Dept: LAB | Facility: HOSPITAL | Age: 26
End: 2021-10-04
Attending: PSYCHIATRY & NEUROLOGY
Payer: COMMERCIAL

## 2021-10-04 DIAGNOSIS — O99.350 EPILEPSY AFFECTING PREGNANCY, ANTEPARTUM (HCC): ICD-10-CM

## 2021-10-04 DIAGNOSIS — G40.909 EPILEPSY AFFECTING PREGNANCY, ANTEPARTUM (HCC): ICD-10-CM

## 2021-10-04 DIAGNOSIS — G40.309 GENERALIZED CONVULSIVE EPILEPSY (HCC): ICD-10-CM

## 2021-10-04 PROCEDURE — 36415 COLL VENOUS BLD VENIPUNCTURE: CPT

## 2021-10-04 PROCEDURE — 80175 DRUG SCREEN QUAN LAMOTRIGINE: CPT

## 2021-10-06 LAB — LAMOTRIGINE SERPL-MCNC: 14.7 UG/ML (ref 2–20)

## 2021-10-23 ENCOUNTER — HOSPITAL ENCOUNTER (EMERGENCY)
Facility: HOSPITAL | Age: 26
Discharge: HOME | End: 2021-10-23
Attending: EMERGENCY MEDICINE | Admitting: EMERGENCY MEDICINE
Payer: COMMERCIAL

## 2021-10-23 VITALS
TEMPERATURE: 98.1 F | HEART RATE: 124 BPM | DIASTOLIC BLOOD PRESSURE: 77 MMHG | RESPIRATION RATE: 16 BRPM | OXYGEN SATURATION: 97 % | SYSTOLIC BLOOD PRESSURE: 124 MMHG

## 2021-10-23 DIAGNOSIS — G40.919 BREAKTHROUGH SEIZURE (HCC): Primary | ICD-10-CM

## 2021-10-23 LAB
ALBUMIN SERPL BCP-MCNC: 3.3 G/DL (ref 3.5–5)
ALP SERPL-CCNC: 218 U/L (ref 46–116)
ALT SERPL W P-5'-P-CCNC: 32 U/L (ref 12–78)
ANION GAP SERPL CALCULATED.3IONS-SCNC: 6 MMOL/L (ref 4–13)
AST SERPL W P-5'-P-CCNC: 26 U/L (ref 5–45)
BACTERIA UR QL AUTO: ABNORMAL /HPF
BILIRUB SERPL-MCNC: 0.37 MG/DL (ref 0.2–1)
BILIRUB UR QL STRIP: NEGATIVE
BUN SERPL-MCNC: 15 MG/DL (ref 5–25)
CALCIUM ALBUM COR SERPL-MCNC: 10.4 MG/DL (ref 8.3–10.1)
CALCIUM SERPL-MCNC: 9.8 MG/DL (ref 8.3–10.1)
CHLORIDE SERPL-SCNC: 106 MMOL/L (ref 100–108)
CLARITY UR: CLEAR
CO2 SERPL-SCNC: 25 MMOL/L (ref 21–32)
COLOR UR: YELLOW
CREAT SERPL-MCNC: 0.7 MG/DL (ref 0.6–1.3)
GFR SERPL CREATININE-BSD FRML MDRD: 119 ML/MIN/1.73SQ M
GLUCOSE SERPL-MCNC: 95 MG/DL (ref 65–140)
GLUCOSE UR STRIP-MCNC: NEGATIVE MG/DL
HGB UR QL STRIP.AUTO: ABNORMAL
HYALINE CASTS #/AREA URNS LPF: ABNORMAL /LPF
KETONES UR STRIP-MCNC: NEGATIVE MG/DL
LEUKOCYTE ESTERASE UR QL STRIP: ABNORMAL
NITRITE UR QL STRIP: NEGATIVE
NON-SQ EPI CELLS URNS QL MICRO: ABNORMAL /HPF
PH UR STRIP.AUTO: 5.5 [PH] (ref 4.5–8)
POTASSIUM SERPL-SCNC: 4.1 MMOL/L (ref 3.5–5.3)
PROT SERPL-MCNC: 7.4 G/DL (ref 6.4–8.2)
PROT UR STRIP-MCNC: ABNORMAL MG/DL
RBC #/AREA URNS AUTO: ABNORMAL /HPF
SODIUM SERPL-SCNC: 137 MMOL/L (ref 136–145)
SP GR UR STRIP.AUTO: >=1.03 (ref 1–1.03)
UROBILINOGEN UR QL STRIP.AUTO: 0.2 E.U./DL
WBC #/AREA URNS AUTO: ABNORMAL /HPF

## 2021-10-23 PROCEDURE — 80053 COMPREHEN METABOLIC PANEL: CPT | Performed by: EMERGENCY MEDICINE

## 2021-10-23 PROCEDURE — 81001 URINALYSIS AUTO W/SCOPE: CPT

## 2021-10-23 PROCEDURE — 80175 DRUG SCREEN QUAN LAMOTRIGINE: CPT | Performed by: EMERGENCY MEDICINE

## 2021-10-23 PROCEDURE — 36415 COLL VENOUS BLD VENIPUNCTURE: CPT | Performed by: EMERGENCY MEDICINE

## 2021-10-23 PROCEDURE — 99284 EMERGENCY DEPT VISIT MOD MDM: CPT | Performed by: EMERGENCY MEDICINE

## 2021-10-23 PROCEDURE — 99284 EMERGENCY DEPT VISIT MOD MDM: CPT

## 2021-10-26 LAB — LAMOTRIGINE SERPL-MCNC: 6.3 UG/ML (ref 2–20)

## 2021-11-19 ENCOUNTER — TELEPHONE (OUTPATIENT)
Dept: NEUROLOGY | Facility: CLINIC | Age: 26
End: 2021-11-19

## 2021-12-27 ENCOUNTER — HOSPITAL ENCOUNTER (EMERGENCY)
Facility: HOSPITAL | Age: 26
Discharge: HOME | End: 2021-12-28
Attending: EMERGENCY MEDICINE
Payer: COMMERCIAL

## 2021-12-27 VITALS
TEMPERATURE: 98.7 F | DIASTOLIC BLOOD PRESSURE: 60 MMHG | OXYGEN SATURATION: 100 % | HEART RATE: 83 BPM | RESPIRATION RATE: 18 BRPM | SYSTOLIC BLOOD PRESSURE: 122 MMHG

## 2021-12-27 DIAGNOSIS — Z11.52 ENCOUNTER FOR SCREENING FOR COVID-19: ICD-10-CM

## 2021-12-27 DIAGNOSIS — R51.9 HEADACHE: ICD-10-CM

## 2021-12-27 DIAGNOSIS — G40.919 BREAKTHROUGH SEIZURE (HCC): Primary | ICD-10-CM

## 2021-12-27 LAB
ALBUMIN SERPL BCP-MCNC: 4.3 G/DL (ref 3.5–5)
ALP SERPL-CCNC: 137 U/L (ref 46–116)
ALT SERPL W P-5'-P-CCNC: 34 U/L (ref 12–78)
ANION GAP SERPL CALCULATED.3IONS-SCNC: 6 MMOL/L (ref 4–13)
AST SERPL W P-5'-P-CCNC: 22 U/L (ref 5–45)
BASOPHILS # BLD AUTO: 0.04 THOUSANDS/ΜL (ref 0–0.1)
BASOPHILS NFR BLD AUTO: 0 % (ref 0–1)
BILIRUB SERPL-MCNC: 0.16 MG/DL (ref 0.2–1)
BUN SERPL-MCNC: 16 MG/DL (ref 5–25)
CALCIUM SERPL-MCNC: 9.1 MG/DL (ref 8.3–10.1)
CHLORIDE SERPL-SCNC: 104 MMOL/L (ref 100–108)
CO2 SERPL-SCNC: 28 MMOL/L (ref 21–32)
CREAT SERPL-MCNC: 0.65 MG/DL (ref 0.6–1.3)
EOSINOPHIL # BLD AUTO: 0.2 THOUSAND/ΜL (ref 0–0.61)
EOSINOPHIL NFR BLD AUTO: 2 % (ref 0–6)
ERYTHROCYTE [DISTWIDTH] IN BLOOD BY AUTOMATED COUNT: 12.1 % (ref 11.6–15.1)
GFR SERPL CREATININE-BSD FRML MDRD: 121 ML/MIN/1.73SQ M
GLUCOSE SERPL-MCNC: 90 MG/DL (ref 65–140)
HCT VFR BLD AUTO: 37.1 % (ref 34.8–46.1)
HGB BLD-MCNC: 12.4 G/DL (ref 11.5–15.4)
IMM GRANULOCYTES # BLD AUTO: 0.03 THOUSAND/UL (ref 0–0.2)
IMM GRANULOCYTES NFR BLD AUTO: 0 % (ref 0–2)
LYMPHOCYTES # BLD AUTO: 1.2 THOUSANDS/ΜL (ref 0.6–4.47)
LYMPHOCYTES NFR BLD AUTO: 11 % (ref 14–44)
MCH RBC QN AUTO: 29.7 PG (ref 26.8–34.3)
MCHC RBC AUTO-ENTMCNC: 33.4 G/DL (ref 31.4–37.4)
MCV RBC AUTO: 89 FL (ref 82–98)
MONOCYTES # BLD AUTO: 0.97 THOUSAND/ΜL (ref 0.17–1.22)
MONOCYTES NFR BLD AUTO: 9 % (ref 4–12)
NEUTROPHILS # BLD AUTO: 8.25 THOUSANDS/ΜL (ref 1.85–7.62)
NEUTS SEG NFR BLD AUTO: 78 % (ref 43–75)
NRBC BLD AUTO-RTO: 0 /100 WBCS
PLATELET # BLD AUTO: 303 THOUSANDS/UL (ref 149–390)
PMV BLD AUTO: 9.8 FL (ref 8.9–12.7)
POTASSIUM SERPL-SCNC: 3.8 MMOL/L (ref 3.5–5.3)
PROT SERPL-MCNC: 7.9 G/DL (ref 6.4–8.2)
RBC # BLD AUTO: 4.17 MILLION/UL (ref 3.81–5.12)
SODIUM SERPL-SCNC: 138 MMOL/L (ref 136–145)
WBC # BLD AUTO: 10.69 THOUSAND/UL (ref 4.31–10.16)

## 2021-12-27 PROCEDURE — 36415 COLL VENOUS BLD VENIPUNCTURE: CPT | Performed by: EMERGENCY MEDICINE

## 2021-12-27 PROCEDURE — 87636 SARSCOV2 & INF A&B AMP PRB: CPT | Performed by: EMERGENCY MEDICINE

## 2021-12-27 PROCEDURE — 85025 COMPLETE CBC W/AUTO DIFF WBC: CPT | Performed by: EMERGENCY MEDICINE

## 2021-12-27 PROCEDURE — 96360 HYDRATION IV INFUSION INIT: CPT

## 2021-12-27 PROCEDURE — 80053 COMPREHEN METABOLIC PANEL: CPT | Performed by: EMERGENCY MEDICINE

## 2021-12-27 PROCEDURE — 99284 EMERGENCY DEPT VISIT MOD MDM: CPT | Performed by: EMERGENCY MEDICINE

## 2021-12-27 PROCEDURE — 99284 EMERGENCY DEPT VISIT MOD MDM: CPT

## 2021-12-27 PROCEDURE — 93005 ELECTROCARDIOGRAM TRACING: CPT

## 2021-12-27 PROCEDURE — 80175 DRUG SCREEN QUAN LAMOTRIGINE: CPT | Performed by: EMERGENCY MEDICINE

## 2021-12-27 RX ORDER — ACETAMINOPHEN 325 MG/1
975 TABLET ORAL ONCE
Status: COMPLETED | OUTPATIENT
Start: 2021-12-27 | End: 2021-12-27

## 2021-12-27 RX ADMIN — SODIUM CHLORIDE 1000 ML: 0.9 INJECTION, SOLUTION INTRAVENOUS at 23:07

## 2021-12-27 RX ADMIN — ACETAMINOPHEN 975 MG: 325 TABLET, FILM COATED ORAL at 23:41

## 2021-12-28 LAB
ATRIAL RATE: 105 BPM
P AXIS: 78 DEGREES
PR INTERVAL: 168 MS
QRS AXIS: 86 DEGREES
QRSD INTERVAL: 86 MS
QT INTERVAL: 334 MS
QTC INTERVAL: 441 MS
T WAVE AXIS: 71 DEGREES
VENTRICULAR RATE: 105 BPM

## 2021-12-28 PROCEDURE — 93010 ELECTROCARDIOGRAM REPORT: CPT | Performed by: INTERNAL MEDICINE

## 2021-12-28 NOTE — ED ATTENDING ATTESTATION
12/27/2021  INoah MD, saw and evaluated the patient  I have discussed the patient with the resident/non-physician practitioner and agree with the resident's/non-physician practitioner's findings, Plan of Care, and MDM as documented in the resident's/non-physician practitioner's note, except where noted  All available labs and Radiology studies were reviewed  I was present for key portions of any procedure(s) performed by the resident/non-physician practitioner and I was immediately available to provide assistance  At this point I agree with the current assessment done in the Emergency Department    I have conducted an independent evaluation of this patient a history and physical is as follows:  Pt has history of seizures Pt had a seizure today Pt has recently had a  Sore throat and has been checking home tests for covid  Last break through seizure was in October  and currently has a suspended license Pt had lamictal dose adjusted Pt had seizure 2 hours ago and has a ha since with vomting Pt denies weakness or numbness or garbled speech Pt is unwilling to take meds due to breast feeding PE: alert perrl cn intact motor 5/5 MDM: will treat ha check covid test Pt agreed to tylenol and fluids   ED Course         Critical Care Time  Procedures

## 2021-12-28 NOTE — ED PROVIDER NOTES
History  Chief Complaint   Patient presents with    Seizure - Prior Hx Of     Pt reports seizure 2 hrs earlier  Now experiencimng headache, chills  + Covid exposure in home     [age]-year-old female with past medical history of seizures presents after a seizure  Patient takes Lamictal for her seizures and reports that she has not missed doses  Patient had a seizure 2 hours prior to arrival   The seizure was unwitnessed, but her family found her shortly after  Patient states that she usually gets headaches after her seizures for about an hour  Patient notes the headache has not been going away  She also complains of body aches and nausea with about 6 or 7 episodes of vomiting  Patient has not taken anything for her symptoms  Patient last had a seizure on 10/23 and was seen here in the ED  She had her license taken away at that time and followed up with Neurology  Neurology increased the dose of her Lamictal   Patient had a baby on 10/06  She reports that while she was pregnant she had a seizure about once a month  Patient was never told that she had eclampsia or preeclampsia  Patient's entire family is COVID positive and patient sent to test earlier today  She reports that her only symptom prior to this seizure was a sore throat  Prior to Admission Medications   Prescriptions Last Dose Informant Patient Reported? Taking?    Prenatal w/o A Vit-Fe Fum-FA (PRENATA PO) Not Taking at Unknown time Self Yes No   Sig: Take by mouth   Patient not taking: Reported on 25/34/0834    folic acid (FOLVITE) 766 MCG tablet Not Taking at Unknown time Self Yes No   Sig: Take 2 tablets by mouth daily    Patient not taking: Reported on 12/27/2021    hydrocortisone 1 % cream Not Taking at Unknown time Self Yes No   Sig: Apply topically 2 (two) times a day   Patient not taking: Reported on 10/23/2021   lamoTRIgine (LaMICtal) 200 MG tablet   No Yes   Sig: Take 2 5 tablets (500 mg total) by mouth 2 (two) times a day   Patient taking differently: Take 300 mg by mouth 2 (two) times a day    ondansetron (ZOFRAN) 4 mg tablet Not Taking at Unknown time Self No No   Sig: Take 1 tablet (4 mg total) by mouth every 6 (six) hours   Patient not taking: Reported on 3/11/2021      Facility-Administered Medications: None       Past Medical History:   Diagnosis Date    Allergic to peanuts     Anxiety     Asthma     Depression     Seizures (HCC)        Past Surgical History:   Procedure Laterality Date    INSERTION OF INTRAUTERINE DEVICE (IUD)         Family History   Problem Relation Age of Onset    Migraines Mother     Asthma Son     Diabetes Maternal Grandfather     Hypertension Maternal Grandfather     Asthma Other     Asthma Sister      I have reviewed and agree with the history as documented  E-Cigarette/Vaping    E-Cigarette Use Never User      E-Cigarette/Vaping Substances     Social History     Tobacco Use    Smoking status: Never Smoker    Smokeless tobacco: Never Used   Vaping Use    Vaping Use: Never used   Substance Use Topics    Alcohol use: Not Currently     Comment: socially     Drug use: No        Review of Systems   Constitutional: Positive for chills  Negative for fever  HENT: Positive for sore throat  Negative for congestion and rhinorrhea  Eyes: Negative for pain and redness  Respiratory: Negative for cough, chest tightness, shortness of breath and wheezing  Cardiovascular: Negative for chest pain and palpitations  Gastrointestinal: Positive for nausea and vomiting  Negative for abdominal pain and diarrhea  Endocrine: Negative  Genitourinary: Negative for difficulty urinating and hematuria  Musculoskeletal: Positive for myalgias  Negative for back pain  Skin: Negative for pallor and rash  Allergic/Immunologic: Negative  Neurological: Positive for seizures and headaches  Negative for dizziness, weakness and light-headedness  Hematological: Negative          Physical Exam  ED Triage Vitals   Temperature Pulse Respirations Blood Pressure SpO2   12/27/21 2255 12/27/21 2244 12/27/21 2244 12/27/21 2244 12/27/21 2244   98 7 °F (37 1 °C) 83 18 122/60 100 %      Temp Source Heart Rate Source Patient Position - Orthostatic VS BP Location FiO2 (%)   12/27/21 2255 12/27/21 2244 12/27/21 2244 12/27/21 2244 --   Oral Monitor Lying Left arm       Pain Score       --                    Orthostatic Vital Signs  Vitals:    12/27/21 2244   BP: 122/60   Pulse: 83   Patient Position - Orthostatic VS: Lying       Physical Exam  Vitals and nursing note reviewed  Constitutional:       General: She is not in acute distress  Appearance: Normal appearance  She is not ill-appearing  HENT:      Head: Normocephalic and atraumatic  Eyes:      Conjunctiva/sclera: Conjunctivae normal    Cardiovascular:      Rate and Rhythm: Normal rate and regular rhythm  Heart sounds: No murmur heard  Pulmonary:      Effort: Pulmonary effort is normal  No respiratory distress  Breath sounds: Normal breath sounds  No wheezing, rhonchi or rales  Musculoskeletal:         General: Normal range of motion  Cervical back: Normal range of motion and neck supple  Skin:     General: Skin is warm and dry  Neurological:      General: No focal deficit present  Mental Status: She is alert and oriented to person, place, and time  Cranial Nerves: No cranial nerve deficit  Motor: No weakness           ED Medications  Medications   sodium chloride 0 9 % bolus 1,000 mL (0 mL Intravenous Stopped 12/28/21 0007)   acetaminophen (TYLENOL) tablet 975 mg (975 mg Oral Given 12/27/21 2341)       Diagnostic Studies  Results Reviewed     Procedure Component Value Units Date/Time    Comprehensive metabolic panel [[id number]]  (Abnormal) Collected: 12/27/21 2307    Lab Status: Final result Specimen: Blood from Arm, Left Updated: 12/27/21 2357     Sodium 138 mmol/L      Potassium 3 8 mmol/L      Chloride 104 mmol/L      CO2 28 mmol/L      ANION GAP 6 mmol/L      BUN 16 mg/dL      Creatinine 0 65 mg/dL      Glucose 90 mg/dL      Calcium 9 1 mg/dL      AST 22 U/L      ALT 34 U/L      Alkaline Phosphatase 137 U/L      Total Protein 7 9 g/dL      Albumin 4 3 g/dL      Total Bilirubin 0 16 mg/dL      eGFR 121 ml/min/1 73sq m     Narrative:      [place] guidelines for Chronic Kidney Disease (CKD):     Stage 1 with normal or high GFR (GFR > 90 mL/min/1 73 square meters)    Stage 2 Mild CKD (GFR = 60-89 mL/min/1 73 square meters)    Stage 3A Moderate CKD (GFR = 45-59 mL/min/1 73 square meters)    Stage 3B Moderate CKD (GFR = 30-44 mL/min/1 73 square meters)    Stage 4 Severe CKD (GFR = 15-29 mL/min/1 73 square meters)    Stage 5 End Stage CKD (GFR <15 mL/min/1 73 square meters)  Note: GFR calculation is accurate only with a steady state creatinine    COVID/FLU - 24 hour TAT [[id number]] Collected: 12/27/21 2340    Lab Status: In process Specimen: Nares from Nose Updated: 12/27/21 2345    CBC and differential [[id number]]  (Abnormal) Collected: 12/27/21 2307    Lab Status: Final result Specimen: Blood from Arm, Left Updated: 12/27/21 2329     WBC 10 69 Thousand/uL      RBC 4 17 Million/uL      Hemoglobin 12 4 g/dL      Hematocrit 37 1 %      MCV 89 fL      MCH 29 7 pg      MCHC 33 4 g/dL      RDW 12 1 %      MPV 9 8 fL      Platelets 925 Thousands/uL      nRBC 0 /100 WBCs      Neutrophils Relative 78 %      Immat GRANS % 0 %      Lymphocytes Relative 11 %      Monocytes Relative 9 %      Eosinophils Relative 2 %      Basophils Relative 0 %      Neutrophils Absolute 8 25 Thousands/µL      Immature Grans Absolute 0 03 Thousand/uL      Lymphocytes Absolute 1 20 Thousands/µL      Monocytes Absolute 0 97 Thousand/µL      Eosinophils Absolute 0 20 Thousand/µL      Basophils Absolute 0 04 Thousands/µL     Lamotrigine level [[id number]] Collected: 12/27/21 2307    Lab Status:  In process Specimen: Blood from Arm, Left Updated: 12/27/21 2321    UA w Reflex to Microscopic w Reflex to Culture [[id number]]     Lab Status: No result Specimen: Urine     POCT pregnancy, urine [[id number]]     Lab Status: No result                  No orders to display         Procedures  Procedures      ED Course  ED Course as of 12/28/21 0054   Mon Dec 27, 2021   2327 Pt wants a covid test here because she only did a home test    2334 Pt has now agreed to tylenol   Tue Dec 28, 2021   0014 Pt feeling better                                       MDM  Number of Diagnoses or Management Options  Breakthrough seizure ([facility] ): established and improving  Encounter for screening for COVID-19: new and does not require workup  Headache: established and improving  Diagnosis management comments: 24-year-old female presents after a seizure with headaches, myalgias, chills, and nausea/vomiting  Patient does not want any medications at this time "because she is breast feeding"  Will send a Lamictal level and check a CBC, and CMP  Patient is agreeable to IV fluids  Patient's license is still suspended from her last breakthrough seizure  Amount and/or Complexity of Data Reviewed  Clinical lab tests: ordered and reviewed  Review and summarize past medical records: yes  Discuss the patient with other providers: yes    Risk of Complications, Morbidity, and/or Mortality  Presenting problems: low  Diagnostic procedures: low  Management options: low    Patient Progress  Patient progress: improved    Patient felt better after receiving Tylenol  Patient was told to follow-up with Neurology  A Lamictal level was sent  She was given information on quarantine if her COVID test does come back positive  Return precautions given      Disposition  Final diagnoses:   Breakthrough seizure ([facility] )   Headache   Encounter for screening for COVID-19     Time reflects when diagnosis was documented in both MDM as applicable and the Disposition within this note     Time User Action Codes Description Comment    12/28/2021 12:15 AM [place] Add [S45 992] Breakthrough seizure ([address] )     12/28/2021 12:15 AM [place] Add [R51 9] Headache     12/28/2021 12:15 AM [place] Add [Z11 52] Encounter for screening for COVID-19       ED Disposition     ED Disposition Condition Date/Time Comment    Discharge Good Tue Dec 28, 2021 12:15 AM [address] discharge to home/self care  Follow-up Information     Follow up With Specialties Details Why Contact Info Additional [facility]   [address] [facility], [address], [place], [place], [address] [facility]          Discharge Medication List as of 12/28/2021 12:16 AM      CONTINUE these medications which have NOT CHANGED    Details   lamoTRIgine (LaMICtal) 200 MG tablet Take 2 5 tablets (500 mg total) by mouth 2 (two) times a day, Starting Mon 9/68/4084, Normal      folic acid (FOLVITE) 698 MCG tablet Take 2 tablets by mouth daily , Historical Med      hydrocortisone 1 % cream Apply topically 2 (two) times a day, Starting Mon 7/12/2021, Until Tue 7/12/2022, Historical Med      ondansetron (ZOFRAN) 4 mg tablet Take 1 tablet (4 mg total) by mouth every 6 (six) hours, Starting Fri 2/19/2021, Normal      Prenatal w/o A Vit-Fe Fum-FA (PRENATA PO) Take by mouth, Historical Med           No discharge procedures on file  PDMP Review     None           ED Provider  Attending physically available and evaluated Betsy Evangelista I managed the patient along with the ED Attending      Electronically Signed by         Manuel Ballard DO  12/28/21 8948

## 2021-12-30 LAB
FLUAV RNA RESP QL NAA+PROBE: NEGATIVE
FLUBV RNA RESP QL NAA+PROBE: NEGATIVE
LAMOTRIGINE SERPL-MCNC: 6.5 UG/ML (ref 2–20)
SARS-COV-2 RNA RESP QL NAA+PROBE: POSITIVE

## 2022-01-10 ENCOUNTER — OFFICE VISIT (OUTPATIENT)
Dept: NEUROLOGY | Facility: CLINIC | Age: 27
End: 2022-01-10
Payer: MEDICARE

## 2022-01-10 VITALS
BODY MASS INDEX: 27.29 KG/M2 | DIASTOLIC BLOOD PRESSURE: 55 MMHG | HEIGHT: 60 IN | WEIGHT: 139 LBS | HEART RATE: 95 BPM | SYSTOLIC BLOOD PRESSURE: 112 MMHG

## 2022-01-10 DIAGNOSIS — G40.309 GENERALIZED CONVULSIVE EPILEPSY (HCC): ICD-10-CM

## 2022-01-10 PROBLEM — G40.909 EPILEPSY AFFECTING PREGNANCY, ANTEPARTUM (HCC): Status: RESOLVED | Noted: 2021-03-22 | Resolved: 2022-01-10

## 2022-01-10 PROBLEM — O99.350 EPILEPSY AFFECTING PREGNANCY, ANTEPARTUM (HCC): Status: RESOLVED | Noted: 2021-03-22 | Resolved: 2022-01-10

## 2022-01-10 PROCEDURE — 99215 OFFICE O/P EST HI 40 MIN: CPT | Performed by: PSYCHIATRY & NEUROLOGY

## 2022-01-10 RX ORDER — LAMOTRIGINE 100 MG/1
350 TABLET ORAL 2 TIMES DAILY
Qty: 210 TABLET | Refills: 5 | Status: SHIPPED | OUTPATIENT
Start: 2022-01-10 | End: 2022-04-22 | Stop reason: SDUPTHER

## 2022-01-10 NOTE — LETTER
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

## 2022-01-10 NOTE — PATIENT INSTRUCTIONS
1  Increase lamotrigine to 350 mg twice daily (100 mg pills, take 3 5 pills twice daily)  2  Use a pill cutter and pill box  3  Let us know if there are seizures  4  Return in about 3 months  5  No driving  6  Have blood work done after taking new dose for one week
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
## 2022-01-12 ENCOUNTER — TELEPHONE (OUTPATIENT)
Dept: NEUROLOGY | Facility: CLINIC | Age: 27
End: 2022-01-12

## 2022-01-12 NOTE — TELEPHONE ENCOUNTER
Patient calling regarding letter  States employer needs clarification on what restrictions are  [facility]

## 2022-01-12 NOTE — LETTER
January 12, 2022       Patient: Cole Roberson   YOB: 1995       To Whom It May Concern:    Romain Melo follows with [facility] for her diagnosis of epilepsy  She was seen in office on 1/10/2022  She may return to work on 1/25/2022  Patient will need the following restrictions: unable to operate heavy machinery (such as forklifts), unable to work or climb to unprotected heights (such as ladders and working at elevated positions on lift vehicles), unable to work with open blades or flames  Please feel free to contact our office if questions or concerns at [phone]      Sincerely,    David Snowden MD

## 2022-01-20 ENCOUNTER — TELEPHONE (OUTPATIENT)
Dept: NEUROLOGY | Facility: CLINIC | Age: 27
End: 2022-01-20

## 2022-01-20 NOTE — TELEPHONE ENCOUNTER
----- Message from Rupert Mcdowell sent at 1/19/2022  5:39 PM EST -----  Regarding: Michelle Doss   I had called to revise the letter you had made for Me the day of my appt  The letter is perfect all my job would like is to have the letter state that my restrictions are permanent  I apologize for another revised letter  I was just told this today by my job      Sherrie Camara

## 2022-01-20 NOTE — LETTER
January 20, 2022       Patient: Lawrence Barahona   YOB: 1995       To Whom It May Concern:     Serg Srinivasan follows with [facility] for her diagnosis of epilepsy  She was seen in office on 1/10/2022  She may return to work on 1/25/2022  Patient will need the following restrictions: unable to operate heavy machinery (such as forklifts), unable to work or climb to unprotected heights (such as ladders and working at elevated positions on lift vehicles), unable to work with open blades or flames  These are permanent restrictions   Please feel free to contact our office if questions or concerns at [phone]      Sincerely,  Pebbles Mccain MD

## 2022-03-21 ENCOUNTER — TELEPHONE (OUTPATIENT)
Dept: NEUROLOGY | Facility: CLINIC | Age: 27
End: 2022-03-21

## 2022-03-21 NOTE — TELEPHONE ENCOUNTER
Patient left message on nursing line requesting information on license restoration  Advised of process  Appears last seizure was 12/27/2021  She will call office at that time

## 2022-04-22 ENCOUNTER — TELEPHONE (OUTPATIENT)
Dept: NEUROLOGY | Facility: CLINIC | Age: 27
End: 2022-04-22

## 2022-04-22 DIAGNOSIS — G40.309 GENERALIZED CONVULSIVE EPILEPSY (HCC): ICD-10-CM

## 2022-04-22 RX ORDER — LAMOTRIGINE 100 MG/1
350 TABLET ORAL 2 TIMES DAILY
Qty: 210 TABLET | Refills: 5 | Status: SHIPPED | OUTPATIENT
Start: 2022-04-22

## 2022-04-22 NOTE — TELEPHONE ENCOUNTER
Patient left message on nursing line requesting new script for lamotrigine as she now uses new pharmacy  Please sign

## 2022-04-26 NOTE — TELEPHONE ENCOUNTER
Fax received from pharmacy requesting PA for lamotrigine 100mg tablets  Aakash ELAM submitted  Awaiting determination

## 2022-05-04 ENCOUNTER — OFFICE VISIT (OUTPATIENT)
Dept: NEUROLOGY | Facility: CLINIC | Age: 27
End: 2022-05-04
Payer: MEDICARE

## 2022-05-04 VITALS
HEART RATE: 82 BPM | WEIGHT: 125 LBS | TEMPERATURE: 97.5 F | HEIGHT: 60 IN | SYSTOLIC BLOOD PRESSURE: 112 MMHG | BODY MASS INDEX: 24.54 KG/M2 | DIASTOLIC BLOOD PRESSURE: 76 MMHG

## 2022-05-04 DIAGNOSIS — G40.309 NONINTRACTABLE GENERALIZED IDIOPATHIC EPILEPSY WITHOUT STATUS EPILEPTICUS (HCC): Primary | ICD-10-CM

## 2022-05-04 PROCEDURE — 99213 OFFICE O/P EST LOW 20 MIN: CPT | Performed by: PSYCHIATRY & NEUROLOGY

## 2022-05-04 NOTE — PATIENT INSTRUCTIONS
1  Continue lamotrigine unchanged  2  Let us know if there are seizures  3  Return in about 4 months  4  Have blood work done in the next few weeks
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
## 2022-05-26 ENCOUNTER — TELEPHONE (OUTPATIENT)
Dept: NEUROLOGY | Facility: CLINIC | Age: 27
End: 2022-05-26

## 2022-05-26 NOTE — TELEPHONE ENCOUNTER
Patient called and left vm wanting to know if we received forms from [employer] regarding her epilepsy     #: [phone]

## 2022-06-09 NOTE — TELEPHONE ENCOUNTER
Patient called and left  regarding her FMLA form  She is looking for an update and has some questions regarding the completion of it     Kim - please contact patient  I do not see completed form in system   Form was paid on 5/27 and should be due for tomorrow 6/10

## 2022-06-10 NOTE — TELEPHONE ENCOUNTER
Forms were signed and emailed back from the provider and scanned into chart  LMOM informing pt forms are ready for  OR would she like office to mail forms    Forms were not faxed to company as patients needs to fill out her section of the form

## 2022-06-21 NOTE — TELEPHONE ENCOUNTER
Pt calling to check status of forms  Advised her of previus  Pt states they only need part of form that the provider needs to complete and sign  Chart reviewed  Completed form printed and faxed successfully to [phone]

## 2022-06-23 ENCOUNTER — TELEPHONE (OUTPATIENT)
Dept: NEUROLOGY | Facility: CLINIC | Age: 27
End: 2022-06-23

## 2022-06-23 NOTE — TELEPHONE ENCOUNTER
Pt LVM stating that she is allowed to get her DL back 6/28 and has forms that need to be filled out  Asking if she can drop off before or does it need to be dropped off after  Called pt and advised her that form cannot be completed before 6/28  But she may drop off form sooner  Pt states that she will drop off form day of  Advised her that there would be a fee

## 2022-06-29 ENCOUNTER — TELEPHONE (OUTPATIENT)
Dept: NEUROLOGY | Facility: CLINIC | Age: 27
End: 2022-06-29

## 2022-06-29 NOTE — TELEPHONE ENCOUNTER
Forms reviewed/signed faxed to [facility] and scanned into chart  Pt made aware

## 2022-06-29 NOTE — TELEPHONE ENCOUNTER
----- Message from George Choi sent at 6/28/2022  5:07 PM EDT -----  Seizure reporting form - payment will be entered on Wednesday  I already ran cash report when she came in with paperwork so she gave me her cc info to run payment tomorrow

## 2022-07-12 NOTE — TELEPHONE ENCOUNTER
Pt left message on refill line for lamotrigine to be sent to [facility] pharmacy  Rx sent to this location on 4/22/22 with 5 refills  Left detailed message for pt (okay per communication consent) advising her to contact pharmacy for her medication

## 2022-08-19 ENCOUNTER — TELEPHONE (OUTPATIENT)
Dept: NEUROLOGY | Facility: CLINIC | Age: 27
End: 2022-08-19

## 2023-02-20 ENCOUNTER — OFFICE VISIT (OUTPATIENT)
Dept: NEUROLOGY | Facility: CLINIC | Age: 28
End: 2023-02-20

## 2023-02-20 VITALS
TEMPERATURE: 98 F | WEIGHT: 128 LBS | DIASTOLIC BLOOD PRESSURE: 70 MMHG | BODY MASS INDEX: 25 KG/M2 | SYSTOLIC BLOOD PRESSURE: 102 MMHG | OXYGEN SATURATION: 97 % | HEART RATE: 98 BPM

## 2023-02-20 DIAGNOSIS — G40.309 GENERALIZED CONVULSIVE EPILEPSY (HCC): ICD-10-CM

## 2023-02-20 DIAGNOSIS — R51.9 HEADACHE: ICD-10-CM

## 2023-02-20 DIAGNOSIS — G40.309 NONINTRACTABLE GENERALIZED IDIOPATHIC EPILEPSY WITHOUT STATUS EPILEPTICUS (HCC): Primary | ICD-10-CM

## 2023-02-20 RX ORDER — LAMOTRIGINE 100 MG/1
350 TABLET ORAL 2 TIMES DAILY
Qty: 630 TABLET | Refills: 3 | Status: SHIPPED | OUTPATIENT
Start: 2023-02-20

## 2023-02-20 RX ORDER — RIBOFLAVIN (VITAMIN B2) 400 MG
400 TABLET ORAL DAILY
Qty: 90 TABLET | Refills: 3 | Status: SHIPPED | OUTPATIENT
Start: 2023-02-20

## 2023-02-20 NOTE — PATIENT INSTRUCTIONS
Continue lamotrigine 350 mg twice daily  Have blood work done  Let us know if there are seizures  Take riboflavin 400 mg daily for headache prevention  Consider naproxen 440 mg for headache if ibuprofen is not working  Return in about 9 months
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
## 2023-05-09 ENCOUNTER — TELEPHONE (OUTPATIENT)
Dept: NEUROLOGY | Facility: CLINIC | Age: 28
End: 2023-05-09

## 2023-05-10 NOTE — TELEPHONE ENCOUNTER
Fax received from pharmacy requesting PA for lamotrigine  Key: Papo ELAM submitted, awaiting determination

## 2023-05-31 NOTE — DISCHARGE INSTRUCTIONS
Cibinqo Counseling: I discussed with the patient the risks of Cibinqo therapy including but not limited to common cold, nausea, headache, cold sores, increased blood CPK levels, dizziness, UTIs, fatigue, acne, and vomitting. Live vaccines should be avoided.  This medication has been linked to serious infections; higher rate of mortality; malignancy and lymphoproliferative disorders; major adverse cardiovascular events; thrombosis; thrombocytopenia and lymphopenia; lipid elevations; and retinal detachment. You have been seen for a breakthrough seizure  You should return to the ED if you develop another seizure, headache, syncope, or other worsening symptoms  Follow up with your neurologist   Continue Lamictal as directed  If your COVID test is positive, you will need to quarantine for 10 days

## 2023-11-22 ENCOUNTER — TELEPHONE (OUTPATIENT)
Dept: NEUROLOGY | Facility: CLINIC | Age: 28
End: 2023-11-22

## 2023-11-22 NOTE — TELEPHONE ENCOUNTER
[facility] for patient to confirm appointment with Dr Cailin Denis in [facility] at on 11/27.

## 2024-02-21 PROBLEM — Z13.1 SCREENING FOR DIABETES MELLITUS: Status: RESOLVED | Noted: 2018-07-25 | Resolved: 2024-02-21

## 2024-03-13 DIAGNOSIS — G40.309 GENERALIZED CONVULSIVE EPILEPSY (HCC): ICD-10-CM

## 2024-03-13 RX ORDER — LAMOTRIGINE 100 MG/1
TABLET ORAL
Qty: 630 TABLET | Refills: 1 | Status: SHIPPED | OUTPATIENT
Start: 2024-03-13

## 2024-03-14 NOTE — TELEPHONE ENCOUNTER
LOV 2/20/23 w/ Niyah for RADHA. Patient is overdue and needs F/U [facility] appointment to refill her meds.   I called patient, No answer, left VM to call back to scheduled.

## 2024-06-10 ENCOUNTER — TELEPHONE (OUTPATIENT)
Dept: NEUROLOGY | Facility: CLINIC | Age: 29
End: 2024-06-10

## 2024-06-10 NOTE — TELEPHONE ENCOUNTER
from [facility] ED called in regarding pt being in ED due to break through seizure..  #[phone]       will be sending pt home, she would like to know if pt doctor wants the lamictal increased?

## 2024-10-22 ENCOUNTER — PATIENT MESSAGE (OUTPATIENT)
Dept: NEUROLOGY | Facility: CLINIC | Age: 29
End: 2024-10-22

## 2024-10-22 NOTE — PATIENT COMMUNICATION
[facility] [phone]    Has been authorized- they are located in [place]    Pt was provided with the number to call and schedule-- was advised that referral was already sent to that location Dr. Linder: please advise or should pt contact her OBGYN to discuss further.

## 2024-12-04 ENCOUNTER — TELEPHONE (OUTPATIENT)
Dept: NEUROLOGY | Facility: CLINIC | Age: 29
End: 2024-12-04

## 2024-12-04 NOTE — TELEPHONE ENCOUNTER
Received via [employer] request for new prescriptions for Lamotrigine.  Request scanned into [employer].

## 2024-12-06 ENCOUNTER — TELEPHONE (OUTPATIENT)
Age: 29
End: 2024-12-06

## 2024-12-06 DIAGNOSIS — G40.309 NONINTRACTABLE GENERALIZED IDIOPATHIC EPILEPSY WITHOUT STATUS EPILEPTICUS (HCC): Primary | ICD-10-CM

## 2024-12-06 DIAGNOSIS — G40.309 GENERALIZED CONVULSIVE EPILEPSY (HCC): ICD-10-CM

## 2024-12-06 NOTE — TELEPHONE ENCOUNTER
Dr. Linder, are you agreeable to writing for new prescriptions for Lamictal?  Patient has not been seen since 2-23-23. Please advise. Thank you!      [facility] [place] [facility], please reach out to patient to schedule a follow up appointment with Dr. Linder.  Thank you!

## 2024-12-09 RX ORDER — LAMOTRIGINE 150 MG/1
150 TABLET ORAL 2 TIMES DAILY
Qty: 60 TABLET | Refills: 1 | Status: SHIPPED | OUTPATIENT
Start: 2024-12-09

## 2024-12-09 RX ORDER — LAMOTRIGINE 200 MG/1
200 TABLET ORAL 2 TIMES DAILY
Qty: 60 TABLET | Refills: 1 | Status: SHIPPED | OUTPATIENT
Start: 2024-12-09

## 2024-12-09 NOTE — TELEPHONE ENCOUNTER
Updated lamotrigine prescription to 150mg and 200mg tabs one tab each twice a day.    She needs to schedule a follow-up appointment with advanced practice practitioner for more refills.

## 2024-12-10 NOTE — TELEPHONE ENCOUNTER
Called pt to inform that the scripts were sent yesterday. She wanted to confirm that there were both 150 mg and 200 mg tablets of lamotrigine sent to the pharmacy. Informed pt that both dosages were ready for . She verbalized an understanding.

## 2024-12-10 NOTE — TELEPHONE ENCOUNTER
Patient called and scheduled appointment.  Please refill.  Patient also asked if 200mg pills could be sent as her insurance will pay for the 200mg pills for the quantity requested.

## 2025-01-16 ENCOUNTER — TELEPHONE (OUTPATIENT)
Age: 30
End: 2025-01-16

## 2025-01-16 NOTE — TELEPHONE ENCOUNTER
Patient called inquiring about next steps or any forms that need to be completed so she can get her license back     Please advise  She does have f/u scheduled 2/12/24 and confirmed appt.

## 2025-01-20 ENCOUNTER — TELEPHONE (OUTPATIENT)
Dept: NEUROLOGY | Facility: CLINIC | Age: 30
End: 2025-01-20

## 2025-01-20 NOTE — TELEPHONE ENCOUNTER
Called and spoke with patient as she is looking to obtain her license. She confirmed her last seizure/seizure like episode was June 11th. She has no other episodes since. Are you agreeable to the completion of the [employer] form?

## 2025-02-12 ENCOUNTER — OFFICE VISIT (OUTPATIENT)
Dept: NEUROLOGY | Facility: CLINIC | Age: 30
End: 2025-02-12
Payer: MEDICARE

## 2025-02-12 VITALS
BODY MASS INDEX: 24.11 KG/M2 | TEMPERATURE: 98 F | HEIGHT: 60 IN | HEART RATE: 89 BPM | SYSTOLIC BLOOD PRESSURE: 110 MMHG | DIASTOLIC BLOOD PRESSURE: 70 MMHG | OXYGEN SATURATION: 99 % | WEIGHT: 122.8 LBS

## 2025-02-12 DIAGNOSIS — G40.309 NONINTRACTABLE GENERALIZED IDIOPATHIC EPILEPSY WITHOUT STATUS EPILEPTICUS (HCC): Primary | ICD-10-CM

## 2025-02-12 DIAGNOSIS — R51.9 HEADACHE: ICD-10-CM

## 2025-02-12 PROCEDURE — 99214 OFFICE O/P EST MOD 30 MIN: CPT | Performed by: PSYCHIATRY & NEUROLOGY

## 2025-02-12 RX ORDER — LAMOTRIGINE 200 MG/1
200 TABLET ORAL 2 TIMES DAILY
Qty: 180 TABLET | Refills: 3 | Status: SHIPPED | OUTPATIENT
Start: 2025-02-12

## 2025-02-12 RX ORDER — LAMOTRIGINE 150 MG/1
150 TABLET ORAL 2 TIMES DAILY
Qty: 180 TABLET | Refills: 3 | Status: SHIPPED | OUTPATIENT
Start: 2025-02-12
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
## 2025-02-12 NOTE — PATIENT INSTRUCTIONS
Take lamotrigine 350 mg morning and 400 mg evening for one week and then increase to 400 mg twice daily.   Call/message our office in 2 weeks to let us know how the increase went and have us send a prescription for he higher dose.   Let us know if there are seizures.   Return in about one year.
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
